# Patient Record
Sex: MALE | Race: ASIAN | Employment: FULL TIME | URBAN - METROPOLITAN AREA
[De-identification: names, ages, dates, MRNs, and addresses within clinical notes are randomized per-mention and may not be internally consistent; named-entity substitution may affect disease eponyms.]

---

## 2017-04-27 ENCOUNTER — OFFICE VISIT (OUTPATIENT)
Dept: PEDIATRICS | Facility: CLINIC | Age: 37
End: 2017-04-27
Payer: COMMERCIAL

## 2017-04-27 ENCOUNTER — RADIANT APPOINTMENT (OUTPATIENT)
Dept: GENERAL RADIOLOGY | Facility: CLINIC | Age: 37
End: 2017-04-27
Attending: NURSE PRACTITIONER
Payer: COMMERCIAL

## 2017-04-27 VITALS
HEART RATE: 91 BPM | WEIGHT: 199.4 LBS | SYSTOLIC BLOOD PRESSURE: 120 MMHG | BODY MASS INDEX: 32.05 KG/M2 | OXYGEN SATURATION: 98 % | TEMPERATURE: 98.9 F | HEIGHT: 66 IN | DIASTOLIC BLOOD PRESSURE: 80 MMHG

## 2017-04-27 DIAGNOSIS — Z00.00 ENCOUNTER FOR ROUTINE ADULT HEALTH EXAMINATION WITHOUT ABNORMAL FINDINGS: Primary | ICD-10-CM

## 2017-04-27 DIAGNOSIS — K62.5 RECTAL BLEEDING: ICD-10-CM

## 2017-04-27 DIAGNOSIS — R05.9 COUGH: ICD-10-CM

## 2017-04-27 DIAGNOSIS — Z13.1 SCREENING FOR DIABETES MELLITUS: ICD-10-CM

## 2017-04-27 DIAGNOSIS — Z13.6 CARDIOVASCULAR SCREENING; LDL GOAL LESS THAN 160: ICD-10-CM

## 2017-04-27 DIAGNOSIS — F17.210 CIGARETTE NICOTINE DEPENDENCE WITHOUT COMPLICATION: ICD-10-CM

## 2017-04-27 PROCEDURE — 99212 OFFICE O/P EST SF 10 MIN: CPT | Mod: 25 | Performed by: NURSE PRACTITIONER

## 2017-04-27 PROCEDURE — 71020 XR CHEST 2 VW: CPT | Performed by: RADIOLOGY

## 2017-04-27 PROCEDURE — 99395 PREV VISIT EST AGE 18-39: CPT | Performed by: NURSE PRACTITIONER

## 2017-04-27 NOTE — MR AVS SNAPSHOT
After Visit Summary   4/27/2017    Main Villalpando    MRN: [id number]           Patient Information     Date Of Birth          1980        Visit Information        Provider Department      4/27/2017 4:40 PM Henrietta Hooker APRN CNP [facility]        Today's Diagnoses     Rectal bleeding    -  1    Encounter for routine adult health examination without abnormal findings        Screening for diabetes mellitus        CARDIOVASCULAR SCREENING; LDL GOAL LESS THAN 160        Cigarette nicotine dependence without complication        Cough          Care Instructions    PLAN:     1.  Orders Placed This Encounter   Medications     varenicline (CHANTIX STARTING MONTH PAK) 0.5 MG X 11 & 1 MG X 42 tablet     Sig: Take 0.5 mg tab daily for 3 days, then 0.5 mg tab twice daily for 4 days, then 1 mg twice daily.     Dispense:  53 tablet     Refill:  0     Orders Placed This Encounter   Procedures     XR Chest 2 Views     Lipid panel reflex to direct LDL     JUST IN CASE     CBC with platelets     Basic metabolic panel     GASTROENTEROLOGY ADULT REF PROCEDURE ONLY       2. Patient needs to follow up in if no improvement,or sooner if worsening of symptoms or other symptoms develop.  FURTHER TESTING:       - chest xray   CONSULTATION/REFERRAL to Gastroenterology  FUTURE LABS:       - Schedule a fasting blood draw   Will follow up and/or notify patient on results via phone or mail to determine further need for followup  Follow up office visit in one year for annual health maintenance exam, sooner PRN.    Preventive Health Recommendations  Male Ages 26 - 39    Yearly exam:             See your health care provider every year in order to  o   Review health changes.   o   Discuss preventive care.    o   Review your medicines if your doctor has prescribed any.    You should be tested each year for STDs (sexually transmitted diseases), if you re at risk.     After age 35, talk to your provider about  cholesterol testing. If you are at risk for heart disease, have your cholesterol tested at least every 5 years.     If you are at risk for diabetes, you should have a diabetes test (fasting glucose).  Shots: Get a flu shot each year. Get a tetanus shot every 10 years.     Nutrition:    Eat at least 5 servings of fruits and vegetables daily.     Eat whole-grain bread, whole-wheat pasta and brown rice instead of white grains and rice.     Talk to your provider about Calcium and Vitamin D.     Lifestyle    Exercise for at least 150 minutes a week (30 minutes a day, 5 days a week). This will help you control your weight and prevent disease.     Limit alcohol to one drink per day.     No smoking.     Wear sunscreen to prevent skin cancer.     See your dentist every six months for an exam and cleaning.   It was a pleasure seeing you today at the [facility]. Thank you for allowing us to care for you today. We truly hope we provided you with the excellent service you deserve. Please let us know if there is anything else we can do for you so we can be sure you are leaving completley satisfied with your care experience.       General information about your clinic   Clinic Hours Lab Hours (Appointments are required)   Mon-Thurs: 7:30 AM - 7 PM Mon-Thurs: 7:30 AM - 7 PM   Fri: 7:30 AM - 5 PM Fri: 7:30 AM - 5 PM        After Hours Nurse Advise & Appts:  [facility]: [phone]  [facility] On Call: to make appointments anytime: [phone] On Call Physician: call [phone] and answering service will page the on call physician.        For urgent appointments, please call [phone] and ask for the triage nurse or your care team clinic nurse.  How to contact my care team:  MyChart: [email or web address]   Phone: [phone]   Fax: [phone]       [facility]:   Phone: [phone]  Hours: 8:00 AM - 6:00 PM  Medication Refills:  Call your pharmacy and they will forward  the refill to us. Please allow 3 business days for your refills to be completed.       Normal or non-critical lab and imaging results will be communicated to you by MyChart, letter or phone within 7 days.  If you do not hear from us within 10 days, please call the clinic. If you have a critical or abnormal lab result, we will notify you by phone as soon as possible.       We now have PWIC (Pediatric Walk in Care)  Monday-Friday from 7:30-4. Simply walk in and be seen for your urgent needs like cough, fever, rash, diarrhea or vomiting, pink eye, UTI. No appointments needed. Ask one of the team for more information      -Your Care Team:    Dr. Evaristo Hopkins - Internal Medicine/Pediatrics   Dr. Corie Munson - Family Medicine  Dr. Viki Meadows - Pediatrics  Henrietta Hooker CNP - Family Practice Nurse Practitioner  Dr. Alberta Alvarado - Pediatrics  Dr. Camilo Richardson - Internal Medicine                    Follow-ups after your visit        Additional Services     GASTROENTEROLOGY ADULT REF PROCEDURE ONLY                 Future tests that were ordered for you today     Open Future Orders        Priority Expected Expires Ordered    XR Chest 2 Views Routine 4/27/2017 4/27/2018 4/27/2017    Lipid panel reflex to direct LDL Routine  12/27/2017 4/27/2017    JUST IN CASE Routine  12/27/2017 4/27/2017    CBC with platelets Routine  12/27/2017 4/27/2017    Basic metabolic panel Routine  12/27/2017 4/27/2017            Who to contact     If you have questions or need follow up information about today's clinic visit or your schedule please contact [facility] directly at [phone].  Normal or non-critical lab and imaging results will be communicated to you by MyChart, letter or phone within 4 business days after the clinic has received the results. If you do not hear from us within 7 days, please contact the clinic through MyChart or phone. If you have a critical or abnormal lab result, we will notify you by phone as  "soon as possible.  Submit refill requests through [employer] or call your pharmacy and they will forward the refill request to us. Please allow 3 business days for your refill to be completed.          Additional Information About Your Visit        UMicIt Information     [employer]It is an electronic gateway that provides easy, online access to your medical records. With [employer]It, you can request a clinic appointment, read your test results, renew a prescription or communicate with your care team.     To sign up for [employer] visit the website at [email or web address]   You will be asked to enter the access code listed below, as well as some personal information. Please follow the directions to create your username and password.     Your access code is: [email or web address]  Expires: 2017  4:50 PM     Your access code will  in 90 days. If you need help or a new code, please contact your [facility] or call [phone] for assistance.        Care EveryWhere ID     This is your Care EveryWhere ID. This could be used by other organizations to access your [facility] medical records  [id number]        Your Vitals Were     Pulse Temperature Height Pulse Oximetry BMI (Body Mass Index)       91 98.9  F (37.2  C) (Temporal) 5' 5.5\" (1.664 m) 98% 32.68 kg/m2        Blood Pressure from Last 3 Encounters:   17 120/80   04/01/15 122/86   14 115/78    Weight from Last 3 Encounters:   17 199 lb 6.4 oz (90.4 kg)   04/01/15 195 lb (88.5 kg)   14 199 lb 9.6 oz (90.5 kg)              We Performed the Following     GASTROENTEROLOGY ADULT REF PROCEDURE ONLY          Today's Medication Changes          These changes are accurate as of: 17  4:51 PM.  If you have any questions, ask your nurse or doctor.               Start taking these medicines.        Dose/Directions    varenicline 0.5 MG X 11 & 1 MG X 42 tablet   Commonly known as:  CHANTIX STARTING MONTH ALLEN   Used for:  " Cigarette nicotine dependence without complication   Started by:  Henreitta Hooker APRN CNP        Take 0.5 mg tab daily for 3 days, then 0.5 mg tab twice daily for 4 days, then 1 mg twice daily.   Quantity:  53 tablet   Refills:  0            Where to get your medicines      These medications were sent to [facility] - [place], [place] - [address], [address]  [address], [address], [facility] [address]     Phone:  [phone]     varenicline 0.5 MG X 11 & 1 MG X 42 tablet                Primary Care Provider    None Specified       No primary provider on file.        Thank you!     Thank you for choosing [facility]  for your care. Our goal is always to provide you with excellent care. Hearing back from our patients is one way we can continue to improve our services. Please take a few minutes to complete the written survey that you may receive in the mail after your visit with us. Thank you!             Your Updated Medication List - Protect others around you: Learn how to safely use, store and throw away your medicines at www.disposemymeds.org.          This list is accurate as of: 4/27/17  4:51 PM.  Always use your most recent med list.                   Brand Name Dispense Instructions for use    albuterol 108 (90 BASE) MCG/ACT Inhaler    PROAIR HFA/PROVENTIL HFA/VENTOLIN HFA    1 Inhaler    Inhale 2 puffs into the lungs every 6 hours as needed for shortness of breath / dyspnea or wheezing       fenofibrate 145 MG tablet     30 tablet    Take 1 tablet (145 mg) by mouth daily       triamcinolone 0.1 % cream    KENALOG    30 g    Apply topically 2 times daily       varenicline 0.5 MG X 11 & 1 MG X 42 tablet    CHANTIX STARTING MONTH ALLEN    53 tablet    Take 0.5 mg tab daily for 3 days, then 0.5 mg tab twice daily for 4 days, then 1 mg twice daily.

## 2017-04-27 NOTE — NURSING NOTE
"Chief Complaint   Patient presents with     Physical     TOMÁS       Initial /80 (BP Location: Right arm, Patient Position: Chair, Cuff Size: Adult Regular)  Pulse 91  Temp 98.9  F (37.2  C) (Temporal)  Ht 5' 5.5\" (1.664 m)  Wt 199 lb 6.4 oz (90.4 kg)  SpO2 98%  BMI 32.68 kg/m2 Estimated body mass index is 32.68 kg/(m^2) as calculated from the following:    Height as of this encounter: 5' 5.5\" (1.664 m).    Weight as of this encounter: 199 lb 6.4 oz (90.4 kg).  Medication Reconciliation: complete      CORI Whatley      "

## 2017-04-27 NOTE — PATIENT INSTRUCTIONS
PLAN:     1.  Orders Placed This Encounter   Medications     varenicline (CHANTIX STARTING MONTH ALLEN) 0.5 MG X 11 & 1 MG X 42 tablet     Sig: Take 0.5 mg tab daily for 3 days, then 0.5 mg tab twice daily for 4 days, then 1 mg twice daily.     Dispense:  53 tablet     Refill:  0     Orders Placed This Encounter   Procedures     XR Chest 2 Views     Lipid panel reflex to direct LDL     JUST IN CASE     CBC with platelets     Basic metabolic panel     GASTROENTEROLOGY ADULT REF PROCEDURE ONLY       2. Patient needs to follow up in if no improvement,or sooner if worsening of symptoms or other symptoms develop.  FURTHER TESTING:       - chest xray   CONSULTATION/REFERRAL to Gastroenterology  FUTURE LABS:       - Schedule a fasting blood draw   Will follow up and/or notify patient on results via phone or mail to determine further need for followup  Follow up office visit in one year for annual health maintenance exam, sooner PRN.    Preventive Health Recommendations  Male Ages 26 - 39    Yearly exam:             See your health care provider every year in order to  o   Review health changes.   o   Discuss preventive care.    o   Review your medicines if your doctor has prescribed any.    You should be tested each year for STDs (sexually transmitted diseases), if you re at risk.     After age 35, talk to your provider about cholesterol testing. If you are at risk for heart disease, have your cholesterol tested at least every 5 years.     If you are at risk for diabetes, you should have a diabetes test (fasting glucose).  Shots: Get a flu shot each year. Get a tetanus shot every 10 years.     Nutrition:    Eat at least 5 servings of fruits and vegetables daily.     Eat whole-grain bread, whole-wheat pasta and brown rice instead of white grains and rice.     Talk to your provider about Calcium and Vitamin D.     Lifestyle    Exercise for at least 150 minutes a week (30 minutes a day, 5 days a week). This will help you  control your weight and prevent disease.     Limit alcohol to one drink per day.     No smoking.     Wear sunscreen to prevent skin cancer.     See your dentist every six months for an exam and cleaning.   It was a pleasure seeing you today at the [facility]. Thank you for allowing us to care for you today. We truly hope we provided you with the excellent service you deserve. Please let us know if there is anything else we can do for you so we can be sure you are leaving completley satisfied with your care experience.       General information about your clinic   Clinic Hours Lab Hours (Appointments are required)   Mon-Thurs: 7:30 AM - 7 PM Mon-Thurs: 7:30 AM - 7 PM   Fri: 7:30 AM - 5 PM Fri: 7:30 AM - 5 PM        After Hours Nurse Advise & Appts:  [facility]: [phone]  [facility] On Call: to make appointments anytime: [phone] On Call Physician: call [phone] and answering service will page the on call physician.        For urgent appointments, please call [phone] and ask for the triage nurse or your care team clinic nurse.  How to contact my care team:  MyChart: [email or web address]   Phone: [phone]   Fax: [phone]       [facility]:   Phone: [phone]  Hours: 8:00 AM - 6:00 PM  Medication Refills:  Call your pharmacy and they will forward the refill to us. Please allow 3 business days for your refills to be completed.       Normal or non-critical lab and imaging results will be communicated to you by MyChart, letter or phone within 7 days.  If you do not hear from us within 10 days, please call the clinic. If you have a critical or abnormal lab result, we will notify you by phone as soon as possible.       We now have PWIC (Pediatric Walk in Care)  Monday-Friday from 7:30-4. Simply walk in and be seen for your urgent needs like cough, fever, rash, diarrhea or vomiting, pink eye, UTI. No appointments needed. Ask one of the team for  more information      -Your Care Team:    Dr. Evaristo Hopkins - Internal Medicine/Pediatrics   Dr. Corie Mnuson - Family Medicine  Dr. Viki Meadows - Pediatrics  Henrietta Hooker CNP - Family Practice Nurse Practitioner  Dr. Alberta Alvarado - Pediatrics  Dr. Camilo Richardson - Internal Medicine

## 2017-04-27 NOTE — PROGRESS NOTES
SUBJECTIVE:     CC: Main Villalpando is an 36 year old male who presents for preventative health visit.     Healthy Habits:    Do you get at least three servings of calcium containing foods daily (dairy, green leafy vegetables, etc.)? yes    Amount of exercise or daily activities, outside of work: 3 day(s) per week    Problems taking medications regularly not applicable    Medication side effects: No    Have you had an eye exam in the past two years? no    Do you see a dentist twice per year? no  Do you have sleep apnea, excessive snoring or daytime drowsiness?yes-snoring    Today's PHQ-2 Score:   PHQ-2 ( 1999 [employer]) 4/27/2017 4/1/2015   Q1: Little interest or pleasure in doing things 0 0   Q2: Feeling down, depressed or hopeless 0 0   PHQ-2 Score 0 0       Abuse: Current or Past(Physical, Sexual or Emotional)- No  Do you feel safe in your environment - Yes    Social History   Substance Use Topics     Smoking status: Current Every Day Smoker     Packs/day: 0.50     Types: Cigarettes     Smokeless tobacco: Never Used     Alcohol use Yes     The patient does not drink >3 drinks per day nor >7 drinks per week.    Last PSA: No results found for: PSA    Recent Labs   Lab Test  05/16/14   1627  04/09/14   0806   CHOL  217*  242*   HDL  38*  28*   LDL  157*  Cannot estimate LDL when triglyceride exceeds 400 mg/dL   TRIG  108  668*   CHOLHDLRATIO  5.6*  8.8*       Reviewed orders with patient. Reviewed health maintenance and updated orders accordingly - Yes    Reviewed and updated as needed this visit by clinical staff  Tobacco  Allergies  Meds  Med Hx  Surg Hx  Fam Hx  Soc Hx        Reviewed and updated as needed this visit by Provider        Past Medical History:   Diagnosis Date     Mild intermittent asthma 4/9/2014      Past Surgical History:   Procedure Laterality Date     LASIK BILATERAL Bilateral 2012       ROS:  CONSTITUTIONAL:NEGATIVE for fever, chills, change in weight  INTEGUMENTARY/SKIN: NEGATIVE for worrisome  rashes, moles or lesions  EYES: NEGATIVE for vision changes or irritation  ENT: NEGATIVE for ear, mouth and throat problems  RESP:NEGATIVE for  SOB and POSITIVE for Hx asthma and has some persistent cough for several weeks   CV: NEGATIVE for chest pain, palpitations or peripheral edema  GI: NEGATIVE for nausea, abdominal pain, heartburn, or change in bowel habits. Had an episode of rectal bleeding about 2 years and then 2 months ago had rectal bleeding. No constipation,   male: negative for dysuria, hematuria, decreased urinary stream, erectile dysfunction, urethral discharge  MUSCULOSKELETAL:NEGATIVE for significant arthralgias or myalgia  NEURO: NEGATIVE for weakness, dizziness or paresthesias  ENDOCRINE: NEGATIVE for temperature intolerance, skin/hair changes  HEME/ALLERGY/IMMUNE: NEGATIVE for allergies, anemia and bleeding disorder  PSYCHIATRIC: NEGATIVE for changes in mood or affect    Patient Active Problem List   Diagnosis     CARDIOVASCULAR SCREENING; LDL GOAL LESS THAN 160     Obesity, Class I, BMI 30-34.9     Mild intermittent asthma     Impaired fasting glucose     Hypertriglyceridemia     Tobacco use disorder     Past Surgical History:   Procedure Laterality Date     LASIK BILATERAL Bilateral 2012       Social History   Substance Use Topics     Smoking status: Current Every Day Smoker     Packs/day: 0.50     Types: Cigarettes     Smokeless tobacco: Never Used     Alcohol use Yes     Family History   Problem Relation Age of Onset     Heart Failure Father      Asthma Maternal Grandmother      DIABETES No family hx of      Coronary Artery Disease No family hx of      Hypertension No family hx of      Hyperlipidemia No family hx of      CEREBROVASCULAR DISEASE No family hx of      Breast Cancer No family hx of      Colon Cancer No family hx of      Prostate Cancer No family hx of      Other Cancer No family hx of      Depression No family hx of      Anxiety Disorder No family hx of      MENTAL ILLNESS No  "family hx of      Substance Abuse No family hx of      Anesthesia Reaction No family hx of      OSTEOPOROSIS No family hx of      Genetic Disorder No family hx of      Thyroid Disease No family hx of      Obesity No family hx of      Unknown/Adopted No family hx of          Current Outpatient Prescriptions   Medication Sig Dispense Refill     albuterol (PROAIR HFA, PROVENTIL HFA, VENTOLIN HFA) 108 (90 BASE) MCG/ACT inhaler Inhale 2 puffs into the lungs every 6 hours as needed for shortness of breath / dyspnea or wheezing 1 Inhaler 3     fenofibrate 145 MG tablet Take 1 tablet (145 mg) by mouth daily (Patient not taking: Reported on 4/27/2017) 30 tablet 5     triamcinolone (KENALOG) 0.1 % cream Apply topically 2 times daily (Patient not taking: Reported on 4/27/2017) 30 g 0     No Known Allergies  OBJECTIVE:     /80 (BP Location: Right arm, Patient Position: Chair, Cuff Size: Adult Regular)  Pulse 91  Temp 98.9  F (37.2  C) (Temporal)  Ht 5' 5.5\" (1.664 m)  Wt 199 lb 6.4 oz (90.4 kg)  SpO2 98%  BMI 32.68 kg/m2  EXAM:  GENERAL: healthy, alert and no distress  EYES: Eyes grossly normal to inspection, PERRL and conjunctivae and sclerae normal  HENT: ear canals and TM's normal, nose and mouth without ulcers or lesions  NECK: no adenopathy, no asymmetry, masses, or scars and thyroid normal to palpation  RESP: lungs clear to auscultation - no rales, rhonchi or wheezes  CV: regular rate and rhythm, normal S1 S2, no S3 or S4, no murmur, click or rub, no peripheral edema and peripheral pulses strong  ABDOMEN: soft, nontender, no hepatosplenomegaly, no masses and bowel sounds normal   (male): normal male genitalia without lesions or urethral discharge, no hernia  RECTAL: normal appearance, no hemorrhoids seen   MS: no gross musculoskeletal defects noted, no edema  SKIN: no suspicious lesions or rashes  NEURO: Normal strength and tone, mentation intact and speech normal  PSYCH: mentation appears normal, affect " normal/bright  LYMPH: no cervical, supraclavicular, axillary, or inguinal adenopathy    Pending orders   XR CHEST 2 VW  4/27/2017 5:45 PM       HISTORY: Cough     COMPARISON: None available.     FINDINGS:   PA and lateral views of the chest were obtained.   Cardiomediastinal silhouette is unremarkable. Pulmonary vasculature is  distinct. No focal airspace opacity. No pleural effusion. No  pneumothorax. The trachea is midline. No acute osseous abnormality.  Visualized upper abdomen unremarkable.         IMPRESSION:   No acute cardiopulmonary abnormality.      I have personally reviewed the examination and initial interpretation  and I agree with the findings.     EVARISTO ALCANTAR MD    ASSESSMENT/PLAN:     Main was seen today for physical.    Diagnoses and all orders for this visit:    Encounter for routine adult health examination without abnormal findings  -     Lipid panel reflex to direct LDL; Future  -     JUST IN CASE; Future  -     CBC with platelets; Future  -     Basic metabolic panel; Future  -     **Vitamin D Deficiency FUTURE anytime; Future    Cough  -     XR Chest 2 Views; Future  Will follow up and/or notify patient on results via phone or mail to determine further need for followup  Further workup and treatment could be done if symptoms persist, worsen or new related symptoms occur. The patient will call in that eventuality.    Rectal bleeding  -     CBC with platelets; Future  -     GASTROENTEROLOGY ADULT REF PROCEDURE ONLY    Screening for diabetes mellitus  -     Basic metabolic panel; Future    CARDIOVASCULAR SCREENING; LDL GOAL LESS THAN 160  -     Lipid panel reflex to direct LDL; Future    Cigarette nicotine dependence without complication  -     varenicline (CHANTIX STARTING MONTH ALLEN) 0.5 MG X 11 & 1 MG X 42 tablet; Take 0.5 mg tab daily for 3 days, then 0.5 mg tab twice daily for 4 days, then 1 mg twice daily.    PLAN:   Patient needs to follow up in if no improvement,or sooner if  "worsening of symptoms or other symptoms develop.  FURTHER TESTING:       - chest xray   CONSULTATION/REFERRAL to Gastroenterology  FUTURE LABS:       - Schedule a fasting blood draw   Will follow up and/or notify patient on results via phone or mail to determine further need for followup  Follow up office visit in one year for annual health maintenance exam, sooner PRN    COUNSELING:  Reviewed preventive health counseling, as reflected in patient instructions  Special attention given to:        Regular exercise       Healthy diet/nutrition       Vision screening         reports that he has been smoking Cigarettes.  He has been smoking about 0.50 packs per day. He has never used smokeless tobacco.  Tobacco Cessation Action Plan: Pharmacotherapies : Chantix  Estimated body mass index is 32.68 kg/(m^2) as calculated from the following:    Height as of this encounter: 5' 5.5\" (1.664 m).    Weight as of this encounter: 199 lb 6.4 oz (90.4 kg).   Weight management plan: Discussed healthy diet and exercise guidelines and patient will follow up in 12 months in clinic to re-evaluate.    Counseling Resources:  ATP IV Guidelines  Pooled Cohorts Equation Calculator  FRAX Risk Assessment  ICSI Preventive Guidelines  Dietary Guidelines for Americans, 2010  USDA's MyPlate  ASA Prophylaxis  Lung CA Screening    Henrietta Hooker, MELISA CNP  [facility]  "

## 2017-04-28 ENCOUNTER — TELEPHONE (OUTPATIENT)
Dept: PEDIATRICS | Facility: CLINIC | Age: 37
End: 2017-04-28

## 2017-04-28 ASSESSMENT — ASTHMA QUESTIONNAIRES: ACT_TOTALSCORE: 22

## 2017-04-28 NOTE — TELEPHONE ENCOUNTER
Attempt #1:  Left message for patient to return call to clinic regarding results. Clinic number given.  Awa Young CMA

## 2017-04-28 NOTE — TELEPHONE ENCOUNTER
Notes Recorded by Henrietta Hooker APRN CNP on 4/28/2017 at 7:47 AM  Please let patient know CXR is normal

## 2017-04-28 NOTE — TELEPHONE ENCOUNTER
[facility] Call Center    Phone Message    Name of Caller: Main    Phone Number: Home number on file [phone] (home) or Cell number on file:    Telephone Information:   Mobile [phone]       Best time to return call: Any    May a detailed message be left on voicemail: yes    Relation to patient: Self    Reason for Call: Other: Patient is returning the clinics call regarding results. Ok to leave msg on personal cell phone. Please advise.      Action Taken: Message routed to:  Primary Care p [phone]

## 2017-05-05 DIAGNOSIS — Z13.1 SCREENING FOR DIABETES MELLITUS: ICD-10-CM

## 2017-05-05 DIAGNOSIS — Z13.6 CARDIOVASCULAR SCREENING; LDL GOAL LESS THAN 160: ICD-10-CM

## 2017-05-05 DIAGNOSIS — E78.1 HYPERTRIGLYCERIDEMIA: Primary | ICD-10-CM

## 2017-05-05 DIAGNOSIS — Z00.00 ENCOUNTER FOR ROUTINE ADULT HEALTH EXAMINATION WITHOUT ABNORMAL FINDINGS: ICD-10-CM

## 2017-05-05 DIAGNOSIS — K62.5 RECTAL BLEEDING: ICD-10-CM

## 2017-05-05 LAB
ALBUMIN SERPL-MCNC: 3.8 G/DL (ref 3.4–5)
ALP SERPL-CCNC: 58 U/L (ref 40–150)
ALT SERPL W P-5'-P-CCNC: 33 U/L (ref 0–70)
ANION GAP SERPL CALCULATED.3IONS-SCNC: 7 MMOL/L (ref 3–14)
AST SERPL W P-5'-P-CCNC: 22 U/L (ref 0–45)
BILIRUB DIRECT SERPL-MCNC: <0.1 MG/DL (ref 0–0.2)
BILIRUB SERPL-MCNC: 0.3 MG/DL (ref 0.2–1.3)
BUN SERPL-MCNC: 18 MG/DL (ref 7–30)
CALCIUM SERPL-MCNC: 8.3 MG/DL (ref 8.5–10.1)
CHLORIDE SERPL-SCNC: 109 MMOL/L (ref 94–109)
CHOLEST SERPL-MCNC: 231 MG/DL
CO2 SERPL-SCNC: 27 MMOL/L (ref 20–32)
CREAT SERPL-MCNC: 1.01 MG/DL (ref 0.66–1.25)
DEPRECATED CALCIDIOL+CALCIFEROL SERPL-MC: 22 UG/L (ref 20–75)
ERYTHROCYTE [DISTWIDTH] IN BLOOD BY AUTOMATED COUNT: 14.2 % (ref 10–15)
GFR SERPL CREATININE-BSD FRML MDRD: 84 ML/MIN/1.7M2
GLUCOSE SERPL-MCNC: 99 MG/DL (ref 70–99)
HCT VFR BLD AUTO: 49 % (ref 40–53)
HDLC SERPL-MCNC: 45 MG/DL
HGB BLD-MCNC: 16.7 G/DL (ref 13.3–17.7)
LDLC SERPL CALC-MCNC: 158 MG/DL
MCH RBC QN AUTO: 29.3 PG (ref 26.5–33)
MCHC RBC AUTO-ENTMCNC: 34.1 G/DL (ref 31.5–36.5)
MCV RBC AUTO: 86 FL (ref 78–100)
NONHDLC SERPL-MCNC: 186 MG/DL
PLATELET # BLD AUTO: 212 10E9/L (ref 150–450)
POTASSIUM SERPL-SCNC: 4.2 MMOL/L (ref 3.4–5.3)
PROT SERPL-MCNC: 7.4 G/DL (ref 6.8–8.8)
RBC # BLD AUTO: 5.7 10E12/L (ref 4.4–5.9)
SODIUM SERPL-SCNC: 143 MMOL/L (ref 133–144)
TRIGL SERPL-MCNC: 140 MG/DL
WBC # BLD AUTO: 6.6 10E9/L (ref 4–11)

## 2017-05-05 PROCEDURE — 82306 VITAMIN D 25 HYDROXY: CPT | Performed by: NURSE PRACTITIONER

## 2017-05-05 PROCEDURE — 80061 LIPID PANEL: CPT | Performed by: NURSE PRACTITIONER

## 2017-05-05 PROCEDURE — 80048 BASIC METABOLIC PNL TOTAL CA: CPT | Performed by: NURSE PRACTITIONER

## 2017-05-05 PROCEDURE — 80076 HEPATIC FUNCTION PANEL: CPT | Performed by: NURSE PRACTITIONER

## 2017-05-05 PROCEDURE — 85027 COMPLETE CBC AUTOMATED: CPT | Performed by: NURSE PRACTITIONER

## 2017-05-05 PROCEDURE — 36415 COLL VENOUS BLD VENIPUNCTURE: CPT | Performed by: NURSE PRACTITIONER

## 2017-05-05 NOTE — LETTER
May 5, 2017      Main Villalpando                                                                [address]  [place] [address]          Dear Main,    The results of your recent   -Kidney function is normal (Cr, GFR), Sodium is normal, Potassium is normal, Calcium is normal, Glucose is normal (diabetes screening test).   -Cholesterol levels are at your goal levels.  ADVISE: Continuing your medication, a regular exercise program with at least 30 minutes of aerobic exercise 3-4 days/week ( 45 minutes 4-6 days/week if weight loss needed), and a low saturated fat,/low carbohydrate diet are helpful to maintain this.  Rechecking your fasting cholesterol panel in 12 months is recommended (Lipid w/ LDL reflex).  -TSH (thyroid stimulating hormone) level is normal which indicates normal thyroid function.  -Normal red blood cell (hgb) levels, normal white blood cell count and normal platelet levels.  -Vitamin D level is below normal, 1000 IU daily in diet or supplements is recommended.     Results for orders placed or performed in visit on 05/05/17   Lipid panel reflex to direct LDL   Result Value Ref Range    Cholesterol 231 (H) <200 mg/dL    Triglycerides 140 <150 mg/dL    HDL Cholesterol 45 >39 mg/dL    LDL Cholesterol Calculated 158 (H) <100 mg/dL    Non HDL Cholesterol 186 (H) <130 mg/dL   CBC with platelets   Result Value Ref Range    WBC 6.6 4.0 - 11.0 10e9/L    RBC Count 5.70 4.4 - 5.9 10e12/L    Hemoglobin 16.7 13.3 - 17.7 g/dL    Hematocrit 49.0 40.0 - 53.0 %    MCV 86 78 - 100 fl    MCH 29.3 26.5 - 33.0 pg    MCHC 34.1 31.5 - 36.5 g/dL    RDW 14.2 10.0 - 15.0 %    Platelet Count 212 150 - 450 10e9/L   Basic metabolic panel   Result Value Ref Range    Sodium 143 133 - 144 mmol/L    Potassium 4.2 3.4 - 5.3 mmol/L    Chloride 109 94 - 109 mmol/L    Carbon Dioxide 27 20 - 32 mmol/L    Anion Gap 7 3 - 14 mmol/L    Glucose 99 70 - 99 mg/dL    Urea Nitrogen 18 7 - 30 mg/dL    Creatinine 1.01 0.66 - 1.25 mg/dL    GFR  Estimate 84 >60 mL/min/1.7m2    GFR Estimate If Black >90   GFR Calc   >60 mL/min/1.7m2    Calcium 8.3 (L) 8.5 - 10.1 mg/dL   **Vitamin D Deficiency FUTURE anytime   Result Value Ref Range    Vitamin D Deficiency screening 22 20 - 75 ug/L         Please make a follow-up appointment if you have additional questions.    Sincerely,       Henrietta Hooker NP, APRN CNP

## 2017-10-06 ENCOUNTER — SURGERY (OUTPATIENT)
Age: 37
End: 2017-10-06

## 2017-10-06 ENCOUNTER — HOSPITAL ENCOUNTER (OUTPATIENT)
Facility: AMBULATORY SURGERY CENTER | Age: 37
Discharge: HOME | End: 2017-10-06
Attending: INTERNAL MEDICINE | Admitting: INTERNAL MEDICINE
Payer: COMMERCIAL

## 2017-10-06 VITALS
DIASTOLIC BLOOD PRESSURE: 79 MMHG | SYSTOLIC BLOOD PRESSURE: 107 MMHG | TEMPERATURE: 97.5 F | OXYGEN SATURATION: 98 % | RESPIRATION RATE: 16 BRPM

## 2017-10-06 LAB — COLONOSCOPY: NORMAL

## 2017-10-06 PROCEDURE — G8918 PT W/O PREOP ORDER IV AB PRO: HCPCS

## 2017-10-06 PROCEDURE — 45385 COLONOSCOPY W/LESION REMOVAL: CPT

## 2017-10-06 PROCEDURE — G8907 PT DOC NO EVENTS ON DISCHARG: HCPCS

## 2017-10-06 PROCEDURE — 88305 TISSUE EXAM BY PATHOLOGIST: CPT | Performed by: INTERNAL MEDICINE

## 2017-10-06 RX ORDER — LIDOCAINE 40 MG/G
CREAM TOPICAL
Status: DISCONTINUED | OUTPATIENT
Start: 2017-10-06 | End: 2017-10-07 | Stop reason: HOSPADM

## 2017-10-06 RX ORDER — FENTANYL CITRATE 50 UG/ML
INJECTION, SOLUTION INTRAMUSCULAR; INTRAVENOUS PRN
Status: DISCONTINUED | OUTPATIENT
Start: 2017-10-06 | End: 2017-10-06 | Stop reason: HOSPADM

## 2017-10-06 RX ORDER — ONDANSETRON 2 MG/ML
4 INJECTION INTRAMUSCULAR; INTRAVENOUS
Status: DISCONTINUED | OUTPATIENT
Start: 2017-10-06 | End: 2017-10-07 | Stop reason: HOSPADM

## 2017-10-06 RX ADMIN — FENTANYL CITRATE 100 MCG: 50 INJECTION, SOLUTION INTRAMUSCULAR; INTRAVENOUS at 09:17

## 2017-10-09 LAB — COPATH REPORT: NORMAL

## 2018-04-30 ENCOUNTER — OFFICE VISIT (OUTPATIENT)
Dept: PEDIATRICS | Facility: CLINIC | Age: 38
End: 2018-04-30
Payer: COMMERCIAL

## 2018-04-30 VITALS
TEMPERATURE: 98.3 F | WEIGHT: 211.6 LBS | OXYGEN SATURATION: 98 % | BODY MASS INDEX: 34.01 KG/M2 | DIASTOLIC BLOOD PRESSURE: 78 MMHG | HEIGHT: 66 IN | SYSTOLIC BLOOD PRESSURE: 112 MMHG | HEART RATE: 98 BPM

## 2018-04-30 DIAGNOSIS — R40.0 DAYTIME SLEEPINESS: ICD-10-CM

## 2018-04-30 DIAGNOSIS — Z13.1 SCREENING FOR DIABETES MELLITUS: ICD-10-CM

## 2018-04-30 DIAGNOSIS — M25.519 NECK AND SHOULDER PAIN: ICD-10-CM

## 2018-04-30 DIAGNOSIS — R06.83 SNORES: ICD-10-CM

## 2018-04-30 DIAGNOSIS — Z13.6 CARDIOVASCULAR SCREENING; LDL GOAL LESS THAN 160: ICD-10-CM

## 2018-04-30 DIAGNOSIS — F17.200 TOBACCO USE DISORDER: ICD-10-CM

## 2018-04-30 DIAGNOSIS — M54.50 BILATERAL LOW BACK PAIN WITHOUT SCIATICA, UNSPECIFIED CHRONICITY: ICD-10-CM

## 2018-04-30 DIAGNOSIS — Z13.29 SCREENING FOR THYROID DISORDER: ICD-10-CM

## 2018-04-30 DIAGNOSIS — M54.2 NECK AND SHOULDER PAIN: ICD-10-CM

## 2018-04-30 DIAGNOSIS — Z00.00 ENCOUNTER FOR ROUTINE ADULT HEALTH EXAMINATION WITHOUT ABNORMAL FINDINGS: Primary | ICD-10-CM

## 2018-04-30 PROCEDURE — 99395 PREV VISIT EST AGE 18-39: CPT | Performed by: NURSE PRACTITIONER

## 2018-04-30 RX ORDER — VARENICLINE TARTRATE 1 MG/1
1 TABLET, FILM COATED ORAL 2 TIMES DAILY
Qty: 56 TABLET | Refills: 2 | Status: SHIPPED | OUTPATIENT
Start: 2018-04-30 | End: 2020-02-20

## 2018-04-30 NOTE — PROGRESS NOTES
SUBJECTIVE:   CC: Main Villalpando is an 37 year old male who presents for preventative health visit.     Healthy Habits:    Do you get at least three servings of calcium containing foods daily (dairy, green leafy vegetables, etc.)? No not really    Amount of exercise or daily activities, outside of work: none    Problems taking medications regularly No    Medication side effects: No    Have you had an eye exam in the past two years? yes    Do you see a dentist twice per year? yes    Do you have sleep apnea, excessive snoring or daytime drowsiness?yes       Today's PHQ-2 Score:   PHQ-2 ( 1999 Pf[employer]) 4/30/2018 4/27/2017   Q1: Little interest or pleasure in doing things 0 0   Q2: Feeling down, depressed or hopeless 1 0   PHQ-2 Score 1 0       Abuse: Current or Past(Physical, Sexual or Emotional)- No  Do you feel safe in your environment - Yes    Social History   Substance Use Topics     Smoking status: Current Every Day Smoker     Packs/day: 0.50     Types: Cigarettes     Smokeless tobacco: Never Used     Alcohol use Yes      If you drink alcohol do you typically have >3 drinks per day or >7 drinks per week? No                      Last PSA: No results found for: PSA    Reviewed orders with patient. Reviewed health maintenance and updated orders accordingly - Yes  Labs reviewed in EPIC  Patient Active Problem List   Diagnosis     CARDIOVASCULAR SCREENING; LDL GOAL LESS THAN 160     Obesity, Class I, BMI 30-34.9     Mild intermittent asthma     Impaired fasting glucose     Hypertriglyceridemia     Tobacco use disorder     Past Surgical History:   Procedure Laterality Date     COLONOSCOPY N/A 10/6/2017    Procedure: COMBINED COLONOSCOPY, SINGLE OR MULTIPLE BIOPSY/POLYPECTOMY BY BIOPSY;;  Surgeon: Duane, William Charles, MD;  Location: [facility] OR     COLONOSCOPY WITH CO2 INSUFFLATION N/A 10/6/2017    Procedure: COLONOSCOPY WITH CO2 INSUFFLATION;  Colonoscopy, Henrietta Hooker, Rectal bleeding, BMI 32.68,  [facility] fax  [phone](prep already picked up, rescheduled);  Surgeon: Duane, William Charles, MD;  Location: [facility] OR     LASIK BILATERAL Bilateral 2012       Social History   Substance Use Topics     Smoking status: Current Every Day Smoker     Packs/day: 0.50     Types: Cigarettes     Smokeless tobacco: Never Used     Alcohol use Yes     Family History   Problem Relation Age of Onset     Heart Failure Father      Hypertension Father      Hyperlipidemia Father      Asthma Maternal Grandmother      Hyperlipidemia Maternal Grandmother      DIABETES No family hx of      Coronary Artery Disease No family hx of      CEREBROVASCULAR DISEASE No family hx of      Breast Cancer No family hx of      Colon Cancer No family hx of      Prostate Cancer No family hx of      Other Cancer No family hx of      Depression No family hx of      Anxiety Disorder No family hx of      MENTAL ILLNESS No family hx of      Substance Abuse No family hx of      Anesthesia Reaction No family hx of      OSTEOPOROSIS No family hx of      Genetic Disorder No family hx of      Thyroid Disease No family hx of      Obesity No family hx of      Unknown/Adopted No family hx of          Current Outpatient Prescriptions   Medication Sig Dispense Refill     albuterol (PROAIR HFA, PROVENTIL HFA, VENTOLIN HFA) 108 (90 BASE) MCG/ACT inhaler Inhale 2 puffs into the lungs every 6 hours as needed for shortness of breath / dyspnea or wheezing 1 Inhaler 3     fenofibrate 145 MG tablet Take 1 tablet (145 mg) by mouth daily 30 tablet 5     triamcinolone (KENALOG) 0.1 % cream Apply topically 2 times daily (Patient not taking: Reported on 4/27/2017) 30 g 0     varenicline (CHANTIX STARTING MONTH PAK) 0.5 MG X 11 & 1 MG X 42 tablet Take 0.5 mg tab daily for 3 days, then 0.5 mg tab twice daily for 4 days, then 1 mg twice daily. (Patient not taking: Reported on 4/30/2018) 53 tablet 0     No Known Allergies    Reviewed and updated as needed this visit by clinical staff      "    Reviewed and updated as needed this visit by Provider        Past Medical History:   Diagnosis Date     Mild intermittent asthma 4/9/2014      Past Surgical History:   Procedure Laterality Date     COLONOSCOPY N/A 10/6/2017    Procedure: COMBINED COLONOSCOPY, SINGLE OR MULTIPLE BIOPSY/POLYPECTOMY BY BIOPSY;;  Surgeon: Duane, William Charles, MD;  Location: [facility]     COLONOSCOPY WITH CO2 INSUFFLATION N/A 10/6/2017    Procedure: COLONOSCOPY WITH CO2 INSUFFLATION;  Colonoscopy, Henrietta Hooker, Rectal bleeding, BMI 32.68, [facility] fax [phone](prep already picked up, rescheduled);  Surgeon: Duane, William Charles, MD;  Location: [facility] OR     LASIK BILATERAL Bilateral 2012     ROS:  CONSTITUTIONAL:POSITIVE  for weight gain and NEGATIVE  for anorexia  INTEGUMENTARY/SKIN: NEGATIVE for worrisome rashes, moles or lesions  EYES: NEGATIVE for vision changes or irritation  ENT: NEGATIVE for ear, mouth and throat problems. Wife says he snores and thinks stops breath at night  RESP:NEGATIVE for significant cough or SOB  CV: NEGATIVE for chest pain, palpitations or peripheral edema  GI: NEGATIVE for nausea, abdominal pain, heartburn, or change in bowel habits   male: negative for dysuria, hematuria, decreased urinary stream, erectile dysfunction, urethral discharge  MUSCULOSKELETAL:NEGATIVE for significant arthralgias or myalgia. Sometime will have some low back pain and will be forever it seems   NEURO: NEGATIVE for weakness, dizziness or paresthesias  ENDOCRINE: NEGATIVE for temperature intolerance, skin/hair changes  HEME/ALLERGY/IMMUNE: NEGATIVE for bleeding problems  PSYCHIATRIC: NEGATIVE for changes in mood or affect    OBJECTIVE:   /78 (BP Location: Right arm, Patient Position: Chair, Cuff Size: Adult Large)  Pulse 98  Temp 98.3  F (36.8  C) (Temporal)  Ht 5' 5.55\" (1.665 m)  Wt 211 lb 9.6 oz (96 kg)  SpO2 98%  BMI 34.62 kg/m2   Wt Readings from Last 4 Encounters:   04/30/18 211 lb 9.6 oz (96 " kg)   04/27/17 199 lb 6.4 oz (90.4 kg)   04/01/15 195 lb (88.5 kg)   05/16/14 199 lb 9.6 oz (90.5 kg)       EXAM:  GENERAL: healthy, alert, no distress and obese  EYES: Eyes grossly normal to inspection, PERRL and conjunctivae and sclerae normal  HENT: ear canals and TM's normal, nose and mouth without ulcers or lesions  NECK: no adenopathy, no asymmetry, masses, or scars and thyroid normal to palpation  RESP: lungs clear to auscultation - no rales, rhonchi or wheezes  CV: regular rate and rhythm, normal S1 S2, no S3 or S4, no murmur, click or rub, no peripheral edema and peripheral pulses strong  ABDOMEN: soft, nontender, no hepatosplenomegaly, no masses and bowel sounds normal   (male): normal male genitalia without lesions or urethral discharge, no hernia  RECTAL: deferred  MS: no gross musculoskeletal defects noted, no edema  SKIN: no suspicious lesions or rashes  NEURO: Normal strength and tone, mentation intact and speech normal  PSYCH: mentation appears normal, affect normal/bright  LYMPH: no cervical, supraclavicular, axillary, or inguinal adenopathy    ASSESSMENT/PLAN:   Main was seen today for physical.    Diagnoses and all orders for this visit:    Encounter for routine adult health examination without abnormal findings  -     Lipid panel reflex to direct LDL Fasting; Future  -     **Comprehensive metabolic panel FUTURE anytime; Future  -     **TSH with free T4 reflex FUTURE anytime; Future  -     JUST IN CASE; Future    CARDIOVASCULAR SCREENING; LDL GOAL LESS THAN 160  -     Lipid panel reflex to direct LDL Fasting; Future    Screening for diabetes mellitus  -     **Comprehensive metabolic panel FUTURE anytime; Future    Screening for thyroid disorder  -     **TSH with free T4 reflex FUTURE anytime; Future    Tobacco use disorder  -     varenicline (CHANTIX STARTING MONTH PAK) 0.5 MG X 11 & 1 MG X 42 tablet; Take 0.5 mg tab daily for 3 days, then 0.5 mg tab twice daily for 4 days, then 1 mg twice  daily.  -     varenicline (CHANTIX) 1 MG tablet; Take 1 tablet (1 mg) by mouth 2 times daily    Snores  -     SLEEP EVALUATION & MANAGEMENT REFERRAL - [facility]  [phone] (Age 15 and up); Future    Daytime sleepiness    Bilateral low back pain without sciatica, unspecified chronicity  -     PHYSICAL THERAPY REFERRAL      1.   Symptomatic therapy suggested: restart chantix.  A high fiber diet with plenty of fluids (up to 8 glasses of water daily) is suggested to relieve these symptoms.  Metamucil, 1 tablespoon once or twice daily can be used to keep bowels regular if needed.    2.  Orders Placed This Encounter   Medications     varenicline (CHANTIX STARTING MONTH PAK) 0.5 MG X 11 & 1 MG X 42 tablet     Sig: Take 0.5 mg tab daily for 3 days, then 0.5 mg tab twice daily for 4 days, then 1 mg twice daily.     Dispense:  53 tablet     Refill:  0     varenicline (CHANTIX) 1 MG tablet     Sig: Take 1 tablet (1 mg) by mouth 2 times daily     Dispense:  56 tablet     Refill:  2     Orders Placed This Encounter   Procedures     Lipid panel reflex to direct LDL Fasting     **Comprehensive metabolic panel FUTURE anytime     **TSH with free T4 reflex FUTURE anytime     JUST IN CASE     SLEEP EVALUATION & MANAGEMENT REFERRAL - [facility]  [phone] (Age 15 and up)     PHYSICAL THERAPY REFERRAL       3. Patient needs to follow up in if no improvement,or sooner if worsening of symptoms or other symptoms develop.  CONSULTATION/REFERRAL to physical therapy and sleep study   FUTURE LABS:       - Schedule a fasting blood draw   Will follow up and/or notify patient on results via phone or mail to determine further need for followup  COUNSELING:  Reviewed preventive health counseling, as reflected in patient instructions  Special attention given to:        Regular exercise       Healthy diet/nutrition       The ASCVD Risk score (Evelyne MEHRAN Jr, et al., 2013) failed  "to calculate for the following reasons:    The 2013 ASCVD risk score is only valid for ages 40 to 79       reports that he has been smoking Cigarettes.  He has been smoking about 0.50 packs per day. He has never used smokeless tobacco.  Tobacco Cessation Action Plan: Pharmacotherapies : Chantix  Estimated body mass index is 32.68 kg/(m^2) as calculated from the following:    Height as of 4/27/17: 5' 5.5\" (1.664 m).    Weight as of 4/27/17: 199 lb 6.4 oz (90.4 kg).   Weight management plan: Discussed healthy diet and exercise guidelines and patient will follow up in 12 months in clinic to re-evaluate.    Counseling Resources:  ATP IV Guidelines  Pooled Cohorts Equation Calculator  FRAX Risk Assessment  ICSI Preventive Guidelines  Dietary Guidelines for Americans, 2010  USDA's MyPlate  ASA Prophylaxis  Lung CA Screening    MELISA Navas CNP  [facility]  "

## 2018-04-30 NOTE — NURSING NOTE
"Chief Complaint   Patient presents with     Physical       Initial /78 (BP Location: Right arm, Patient Position: Chair, Cuff Size: Adult Large)  Pulse 98  Temp 98.3  F (36.8  C) (Temporal)  Ht 5' 5.55\" (1.665 m)  Wt 211 lb 9.6 oz (96 kg)  SpO2 98%  BMI 34.62 kg/m2 Estimated body mass index is 34.62 kg/(m^2) as calculated from the following:    Height as of this encounter: 5' 5.55\" (1.665 m).    Weight as of this encounter: 211 lb 9.6 oz (96 kg).  Medication Reconciliation: complete     Liya Morales CMA      "

## 2018-04-30 NOTE — PATIENT INSTRUCTIONS
PLAN:   1.   Symptomatic therapy suggested: restart chantix.  A high fiber diet with plenty of fluids (up to 8 glasses of water daily) is suggested to relieve these symptoms.  Metamucil, 1 tablespoon once or twice daily can be used to keep bowels regular if needed.    2.  Orders Placed This Encounter   Medications     varenicline (CHANTIX STARTING MONTH ALLEN) 0.5 MG X 11 & 1 MG X 42 tablet     Sig: Take 0.5 mg tab daily for 3 days, then 0.5 mg tab twice daily for 4 days, then 1 mg twice daily.     Dispense:  53 tablet     Refill:  0     varenicline (CHANTIX) 1 MG tablet     Sig: Take 1 tablet (1 mg) by mouth 2 times daily     Dispense:  56 tablet     Refill:  2     Orders Placed This Encounter   Procedures     Lipid panel reflex to direct LDL Fasting     **Comprehensive metabolic panel FUTURE anytime     **TSH with free T4 reflex FUTURE anytime     JUST IN CASE     SLEEP EVALUATION & MANAGEMENT REFERRAL - [facility]  [phone] (Age 15 and up)     PHYSICAL THERAPY REFERRAL       3. Patient needs to follow up in if no improvement,or sooner if worsening of symptoms or other symptoms develop.  CONSULTATION/REFERRAL to physical therapy and sleep study   FUTURE LABS:       - Schedule a fasting blood draw   Will follow up and/or notify patient on results via phone or mail to determine further need for followup  Preventive Health Recommendations  Male Ages 26 - 39    Yearly exam:             See your health care provider every year in order to  o   Review health changes.   o   Discuss preventive care.    o   Review your medicines if your doctor has prescribed any.    You should be tested each year for STDs (sexually transmitted diseases), if you re at risk.     After age 35, talk to your provider about cholesterol testing. If you are at risk for heart disease, have your cholesterol tested at least every 5 years.     If you are at risk for diabetes, you should have a diabetes test  (fasting glucose).  Shots: Get a flu shot each year. Get a tetanus shot every 10 years.     Nutrition:    Eat at least 5 servings of fruits and vegetables daily.     Eat whole-grain bread, whole-wheat pasta and brown rice instead of white grains and rice.     Talk to your provider about Calcium and Vitamin D.     Lifestyle    Exercise for at least 150 minutes a week (30 minutes a day, 5 days a week). This will help you control your weight and prevent disease.     Limit alcohol to one drink per day.     No smoking.     Wear sunscreen to prevent skin cancer.     See your dentist every six months for an exam and cleaning.   It was a pleasure seeing you today at the [facility]. Thank you for allowing us to care for you today. We truly hope we provided you with the excellent service you deserve. Please let us know if there is anything else we can do for you so we can be sure you are leaving completley satisfied with your care experience.       General information about your clinic   Clinic Hours Lab Hours (Appointments are required)   Mon-Thurs: 7:30 AM - 7 PM Mon-Thurs: 7:30 AM - 7 PM   Fri: 7:30 AM - 5 PM Fri: 7:30 AM - 5 PM        After Hours Nurse Advise & Appts:  [facility]: [phone]  [facility] On Call: to make appointments anytime: [phone] On Call Physician: call [phone] and answering service will page the on call physician.        For urgent appointments, please call [phone] and ask for the triage nurse or your care team clinic nurse.  How to contact my care team:  [facility]: [email or web address]   Phone: [phone]   Fax: [phone]       [facility]:   Phone: [phone]  Hours: 8:00 AM - 6:00 PM  Medication Refills:  Call your pharmacy and they will forward the refill to us. Please allow 3 business days for your refills to be completed.       Normal or non-critical lab and imaging results will be communicated to you by [facility], letter  or phone within 7 days.  If you do not hear from us within 10 days, please call the clinic. If you have a critical or abnormal lab result, we will notify you by phone as soon as possible.       We now have PWIC (Pediatric Walk in Care)  Monday-Friday from 7:30-4. Simply walk in and be seen for your urgent needs like cough, fever, rash, diarrhea or vomiting, pink eye, UTI. No appointments needed. Ask one of the team for more information      -Your Care Team:    Dr. Evaristo Hopkins - Internal Medicine/Pediatrics   Dr. Corie Munson - Family Medicine  Dr. Viki Meadows - Pediatrics  Dr. Alberta Alvarado - Pediatrics  Henrietta Hooker CNP - Family Practice Nurse Practitioner

## 2018-04-30 NOTE — MR AVS SNAPSHOT
After Visit Summary   4/30/2018    Main Villalpando    MRN: [id number]           Patient Information     Date Of Birth          1980        Visit Information        Provider Department      4/30/2018 4:30 PM Henrietta Hooker APRN CNP [facility]        Today's Diagnoses     Encounter for routine adult health examination without abnormal findings    -  1    CARDIOVASCULAR SCREENING; LDL GOAL LESS THAN 160        Screening for diabetes mellitus        Screening for thyroid disorder        Tobacco use disorder        Snores        Daytime sleepiness        Bilateral low back pain without sciatica, unspecified chronicity          Care Instructions    PLAN:   1.   Symptomatic therapy suggested: restart chantix.  A high fiber diet with plenty of fluids (up to 8 glasses of water daily) is suggested to relieve these symptoms.  Metamucil, 1 tablespoon once or twice daily can be used to keep bowels regular if needed.    2.  Orders Placed This Encounter   Medications     varenicline (CHANTIX STARTING MONTH PAK) 0.5 MG X 11 & 1 MG X 42 tablet     Sig: Take 0.5 mg tab daily for 3 days, then 0.5 mg tab twice daily for 4 days, then 1 mg twice daily.     Dispense:  53 tablet     Refill:  0     varenicline (CHANTIX) 1 MG tablet     Sig: Take 1 tablet (1 mg) by mouth 2 times daily     Dispense:  56 tablet     Refill:  2     Orders Placed This Encounter   Procedures     Lipid panel reflex to direct LDL Fasting     **Comprehensive metabolic panel FUTURE anytime     **TSH with free T4 reflex FUTURE anytime     JUST IN CASE     SLEEP EVALUATION & MANAGEMENT REFERRAL - ADULT -[facility] [place]  [phone] (Age 15 and up)     PHYSICAL THERAPY REFERRAL       3. Patient needs to follow up in if no improvement,or sooner if worsening of symptoms or other symptoms develop.  CONSULTATION/REFERRAL to physical therapy and sleep study   FUTURE LABS:       - Schedule a fasting blood draw    Will follow up and/or notify patient on results via phone or mail to determine further need for followup  Preventive Health Recommendations  Male Ages 26 - 39    Yearly exam:             See your health care provider every year in order to  o   Review health changes.   o   Discuss preventive care.    o   Review your medicines if your doctor has prescribed any.    You should be tested each year for STDs (sexually transmitted diseases), if you re at risk.     After age 35, talk to your provider about cholesterol testing. If you are at risk for heart disease, have your cholesterol tested at least every 5 years.     If you are at risk for diabetes, you should have a diabetes test (fasting glucose).  Shots: Get a flu shot each year. Get a tetanus shot every 10 years.     Nutrition:    Eat at least 5 servings of fruits and vegetables daily.     Eat whole-grain bread, whole-wheat pasta and brown rice instead of white grains and rice.     Talk to your provider about Calcium and Vitamin D.     Lifestyle    Exercise for at least 150 minutes a week (30 minutes a day, 5 days a week). This will help you control your weight and prevent disease.     Limit alcohol to one drink per day.     No smoking.     Wear sunscreen to prevent skin cancer.     See your dentist every six months for an exam and cleaning.   It was a pleasure seeing you today at the [facility]. Thank you for allowing us to care for you today. We truly hope we provided you with the excellent service you deserve. Please let us know if there is anything else we can do for you so we can be sure you are leaving completley satisfied with your care experience.       General information about your clinic   Clinic Hours Lab Hours (Appointments are required)   Mon-Thurs: 7:30 AM - 7 PM Mon-Thurs: 7:30 AM - 7 PM   Fri: 7:30 AM - 5 PM Fri: 7:30 AM - 5 PM        After Hours Nurse Advise & Appts:  [facility]: [phone]  [facility]  On Call: to make appointments anytime: [phone] On Call Physician: call [phone] and answering service will page the on call physician.        For urgent appointments, please call [phone] and ask for the triage nurse or your care team clinic nurse.  How to contact my care team:  [facility]: [email or web address]   Phone: [phone]   Fax: [phone]       [facility]:   Phone: [phone]  Hours: 8:00 AM - 6:00 PM  Medication Refills:  Call your pharmacy and they will forward the refill to us. Please allow 3 business days for your refills to be completed.       Normal or non-critical lab and imaging results will be communicated to you by MyChart, letter or phone within 7 days.  If you do not hear from us within 10 days, please call the clinic. If you have a critical or abnormal lab result, we will notify you by phone as soon as possible.       We now have PWIC (Pediatric Walk in Care)  Monday-Friday from 7:30-4. Simply walk in and be seen for your urgent needs like cough, fever, rash, diarrhea or vomiting, pink eye, UTI. No appointments needed. Ask one of the team for more information      -Your Care Team:    Dr. Evaristo Hopkins - Internal Medicine/Pediatrics   Dr. Corie Munson - Family Medicine  Dr. Viki Meadows - Pediatrics  Dr. Alberta Alvarado - Pediatrics  Henrietta Hooker CNP - Family Practice Nurse Practitioner                         Follow-ups after your visit        Additional Services     PHYSICAL THERAPY REFERRAL       *This therapy referral will be filtered to a centralized scheduling office at [facility] and the patient will receive a call to schedule an appointment at a [facility] location most convenient for them. *     [facility] provides Physical Therapy evaluation and treatment and many specialty services across the [facility] system.  If requesting a specialty program, please choose from the list below.    If you have not heard from the  "scheduling office within 2 business days, please call [phone] for all locations, with the exception of [place], please call [phone] and [place], please call [phone]  Treatment: Evaluation & Treatment  Special Instructions/Modalities:   Special Programs: None    Please be aware that coverage of these services is subject to the terms and limitations of your health insurance plan.  Call member services at your health plan with any benefit or coverage questions.      **Note to Provider:  If you are referring outside of [place] [facility] for the therapy appointment, please list the name of the location in the \"special instructions\" above, print the referral and give to the patient to schedule the appointment.            SLEEP EVALUATION & MANAGEMENT REFERRAL - [facility]  [phone] (Age 15 and up)       Please be aware that coverage of these services is subject to the terms and limitations of your health insurance plan.  Call member services at your health plan with any benefit or coverage questions.      Please bring the following to your appointment:    >>   List of current medications   >>   This referral request   >>   Any documents/labs given to you for this referral                      Future tests that were ordered for you today     Open Future Orders        Priority Expected Expires Ordered    SLEEP EVALUATION & MANAGEMENT REFERRAL - [facility]  [phone] (Age 15 and up) Routine  4/30/2019 4/30/2018    Lipid panel reflex to direct LDL Fasting Routine 4/30/2018 12/30/2018 4/30/2018    **Comprehensive metabolic panel FUTURE anytime Routine 4/30/2018 12/30/2018 4/30/2018    **TSH with free T4 reflex FUTURE anytime Routine 4/30/2018 12/30/2018 4/30/2018    JUST IN CASE Routine  12/30/2018 4/30/2018            Who to contact     If you have questions or need follow up information about today's clinic visit or your schedule please " "contact [facility] directly at [phone].  Normal or non-critical lab and imaging results will be communicated to you by MyChart, letter or phone within 4 business days after the clinic has received the results. If you do not hear from us within 7 days, please contact the clinic through MyChart or phone. If you have a critical or abnormal lab result, we will notify you by phone as soon as possible.  Submit refill requests through [employer] [place] or call your pharmacy and they will forward the refill request to us. Please allow 3 business days for your refill to be completed.          Additional Information About Your Visit        [employer] [place] Information     [employer] [place] is an electronic gateway that provides easy, online access to your medical records. With [employer] [place], you can request a clinic appointment, read your test results, renew a prescription or communicate with your care team.     To sign up for [employer] [place] visit the website at [email or web address]   You will be asked to enter the access code listed below, as well as some personal information. Please follow the directions to create your username and password.     Your access code is: [email or web address]  Expires: 2018  5:05 PM     Your access code will  in 90 days. If you need help or a new code, please contact your [facility] or call [phone] for assistance.        Care EveryWhere ID     This is your Care EveryWhere ID. This could be used by other organizations to access your [place] medical records  [id number]        Your Vitals Were     Pulse Temperature Height Pulse Oximetry BMI (Body Mass Index)       98 98.3  F (36.8  C) (Temporal) 5' 5.55\" (1.665 m) 98% 34.62 kg/m2        Blood Pressure from Last 3 Encounters:   18 112/78   10/06/17 107/79   17 120/80    Weight from Last 3 Encounters:   18 211 lb 9.6 oz (96 kg)   17 199 lb 6.4 oz (90.4 kg)   04/01/15 195 lb (88.5 kg)            "   We Performed the Following     PHYSICAL THERAPY REFERRAL          Today's Medication Changes          These changes are accurate as of 4/30/18  5:05 PM.  If you have any questions, ask your nurse or doctor.               These medicines have changed or have updated prescriptions.        Dose/Directions    * varenicline 0.5 MG X 11 & 1 MG X 42 tablet   Commonly known as:  CHANTIX STARTING MONTH ALLEN   This may have changed:  Another medication with the same name was added. Make sure you understand how and when to take each.   Used for:  Cigarette nicotine dependence without complication   Changed by:  Henrietta Hooker APRN CNP        Take 0.5 mg tab daily for 3 days, then 0.5 mg tab twice daily for 4 days, then 1 mg twice daily.   Quantity:  53 tablet   Refills:  0       * varenicline 0.5 MG X 11 & 1 MG X 42 tablet   Commonly known as:  CHANTIX STARTING MONTH ALLEN   This may have changed:  You were already taking a medication with the same name, and this prescription was added. Make sure you understand how and when to take each.   Used for:  Tobacco use disorder   Changed by:  Henrietta Hooker APRN CNP        Take 0.5 mg tab daily for 3 days, then 0.5 mg tab twice daily for 4 days, then 1 mg twice daily.   Quantity:  53 tablet   Refills:  0       * varenicline 1 MG tablet   Commonly known as:  CHANTIX   This may have changed:  You were already taking a medication with the same name, and this prescription was added. Make sure you understand how and when to take each.   Used for:  Tobacco use disorder   Changed by:  Henrietta Hooker APRN CNP        Dose:  1 mg   Take 1 tablet (1 mg) by mouth 2 times daily   Quantity:  56 tablet   Refills:  2       * Notice:  This list has 3 medication(s) that are the same as other medications prescribed for you. Read the directions carefully, and ask your doctor or other care provider to review them with you.         Where to get your medicines      These medications  were sent to [facility] - [place] - [address], [address]  [address], [address], [facility] [address]     Phone:  [phone]     varenicline 0.5 MG X 11 & 1 MG X 42 tablet    varenicline 1 MG tablet                Primary Care Provider Office Phone # Fax #    Henrietta Hooker, APRN -[phone]       [address]  [place] [address]        Equal Access to Services     [facility]: Hadii aad ku hadasho Soomaali, waaxda luqadaha, qaybta kaalmada adeegyada, waxay idiin hayaan adeshahbaz grimes . So [facility] [phone].    ATENCIÓN: Si habla español, tiene a stallings disposición servicios gratuitos de asistencia lingüística. [facility] [phone].    We comply with applicable federal civil rights laws and [place] laws. We do not discriminate on the basis of race, color, national origin, age, disability, sex, sexual orientation, or gender identity.            Thank you!     Thank you for choosing [facility]  for your care. Our goal is always to provide you with excellent care. Hearing back from our patients is one way we can continue to improve our services. Please take a few minutes to complete the written survey that you may receive in the mail after your visit with us. Thank you!             Your Updated Medication List - Protect others around you: Learn how to safely use, store and throw away your medicines at www.disposemymeds.org.          This list is accurate as of 4/30/18  5:05 PM.  Always use your most recent med list.                   Brand Name Dispense Instructions for use Diagnosis    albuterol 108 (90 Base) MCG/ACT Inhaler    PROAIR HFA/PROVENTIL HFA/VENTOLIN HFA    1 Inhaler    Inhale 2 puffs into the lungs every 6 hours as needed for shortness of breath / dyspnea or wheezing    Mild intermittent asthma       fenofibrate 145 MG tablet     30 tablet    Take 1 tablet (145 mg) by mouth daily     Hypertriglyceridemia       triamcinolone 0.1 % cream    KENALOG    30 g    Apply topically 2 times daily    Xerosis cutis       * varenicline 0.5 MG X 11 & 1 MG X 42 tablet    CHANTIX STARTING MONTH ALLEN    53 tablet    Take 0.5 mg tab daily for 3 days, then 0.5 mg tab twice daily for 4 days, then 1 mg twice daily.    Cigarette nicotine dependence without complication       * varenicline 0.5 MG X 11 & 1 MG X 42 tablet    CHANTIX STARTING MONTH ALLEN    53 tablet    Take 0.5 mg tab daily for 3 days, then 0.5 mg tab twice daily for 4 days, then 1 mg twice daily.    Tobacco use disorder       * varenicline 1 MG tablet    CHANTIX    56 tablet    Take 1 tablet (1 mg) by mouth 2 times daily    Tobacco use disorder       * Notice:  This list has 3 medication(s) that are the same as other medications prescribed for you. Read the directions carefully, and ask your doctor or other care provider to review them with you.

## 2018-05-01 DIAGNOSIS — Z13.29 SCREENING FOR THYROID DISORDER: ICD-10-CM

## 2018-05-01 DIAGNOSIS — Z13.1 SCREENING FOR DIABETES MELLITUS: ICD-10-CM

## 2018-05-01 DIAGNOSIS — Z13.6 CARDIOVASCULAR SCREENING; LDL GOAL LESS THAN 160: ICD-10-CM

## 2018-05-01 DIAGNOSIS — Z00.00 ENCOUNTER FOR ROUTINE ADULT HEALTH EXAMINATION WITHOUT ABNORMAL FINDINGS: ICD-10-CM

## 2018-05-01 LAB
ALBUMIN SERPL-MCNC: 3.6 G/DL (ref 3.4–5)
ALP SERPL-CCNC: 57 U/L (ref 40–150)
ALT SERPL W P-5'-P-CCNC: 48 U/L (ref 0–70)
ANION GAP SERPL CALCULATED.3IONS-SCNC: 6 MMOL/L (ref 3–14)
AST SERPL W P-5'-P-CCNC: 30 U/L (ref 0–45)
BILIRUB SERPL-MCNC: 0.4 MG/DL (ref 0.2–1.3)
BUN SERPL-MCNC: 16 MG/DL (ref 7–30)
CALCIUM SERPL-MCNC: 8.1 MG/DL (ref 8.5–10.1)
CHLORIDE SERPL-SCNC: 109 MMOL/L (ref 94–109)
CHOLEST SERPL-MCNC: 254 MG/DL
CO2 SERPL-SCNC: 24 MMOL/L (ref 20–32)
CREAT SERPL-MCNC: 0.97 MG/DL (ref 0.66–1.25)
GFR SERPL CREATININE-BSD FRML MDRD: 88 ML/MIN/1.7M2
GLUCOSE SERPL-MCNC: 98 MG/DL (ref 70–99)
HDLC SERPL-MCNC: 44 MG/DL
LDLC SERPL CALC-MCNC: 167 MG/DL
NONHDLC SERPL-MCNC: 210 MG/DL
POTASSIUM SERPL-SCNC: 4.3 MMOL/L (ref 3.4–5.3)
PROT SERPL-MCNC: 7.3 G/DL (ref 6.8–8.8)
SODIUM SERPL-SCNC: 139 MMOL/L (ref 133–144)
TRIGL SERPL-MCNC: 213 MG/DL
TSH SERPL DL<=0.005 MIU/L-ACNC: 0.62 MU/L (ref 0.4–4)

## 2018-05-01 PROCEDURE — 36415 COLL VENOUS BLD VENIPUNCTURE: CPT | Performed by: NURSE PRACTITIONER

## 2018-05-01 PROCEDURE — 84443 ASSAY THYROID STIM HORMONE: CPT | Performed by: NURSE PRACTITIONER

## 2018-05-01 PROCEDURE — 80061 LIPID PANEL: CPT | Performed by: NURSE PRACTITIONER

## 2018-05-01 PROCEDURE — 80053 COMPREHEN METABOLIC PANEL: CPT | Performed by: NURSE PRACTITIONER

## 2018-05-02 NOTE — PROGRESS NOTES
Gosia Villalpando,    Attached are your test results.  -Liver and gallbladder tests are normal. (ALT,AST, Alk phos, bilirubin), kidney function is normal (Cr, GFR), Sodium is normal, Potassium is normal, Calcium is normal, Glucose is normal (diabetes screening test).   -LDL(bad) cholesterol level is elevated, HDL(good) cholesterol level is low and your triglycerides are elevated which can increase your heart disease risk.  A diet high in fat and simple carbohydrates, genetics and being overweight can contribute to this. ADVISE: Exercise, a low fat, low carbohydrate diet, weight control, and omega-3 fatty acids (fish oil) 7563-2088 mg daily are helpful to improve this.  Rechecking your fasting cholesterol panel in 6 months is recommended (Lipid w/ LDL reflex, DX: hyperlipidemia)  -TSH (thyroid stimulating hormone) level is normal which indicates normal thyroid function.   Please contact us if you have any questions.    Henrietta Hooker, CNP

## 2018-05-07 ENCOUNTER — TELEPHONE (OUTPATIENT)
Dept: PEDIATRICS | Facility: CLINIC | Age: 38
End: 2018-05-07

## 2018-05-07 NOTE — TELEPHONE ENCOUNTER
[employer] Biometric Screening form completed by Lora Hooker and mailed to patients home address at [address] [address].  Copy of form   Sent to [facility] to be scanned in patients chart  Priscilla Newby CMA

## 2018-05-07 NOTE — TELEPHONE ENCOUNTER
Received Biometric Screening form to be completed and sent back to the patient at his home address.    Liya Morales CMA

## 2018-05-15 ENCOUNTER — THERAPY VISIT (OUTPATIENT)
Dept: PHYSICAL THERAPY | Facility: CLINIC | Age: 38
End: 2018-05-15
Payer: COMMERCIAL

## 2018-05-15 DIAGNOSIS — G89.29 CHRONIC BILATERAL LOW BACK PAIN WITHOUT SCIATICA: Primary | ICD-10-CM

## 2018-05-15 DIAGNOSIS — M54.50 CHRONIC BILATERAL LOW BACK PAIN WITHOUT SCIATICA: Primary | ICD-10-CM

## 2018-05-15 PROCEDURE — 97110 THERAPEUTIC EXERCISES: CPT | Mod: GP | Performed by: PHYSICAL THERAPIST

## 2018-05-15 PROCEDURE — 97161 PT EVAL LOW COMPLEX 20 MIN: CPT | Mod: GP | Performed by: PHYSICAL THERAPIST

## 2018-05-15 PROCEDURE — 97112 NEUROMUSCULAR REEDUCATION: CPT | Mod: GP | Performed by: PHYSICAL THERAPIST

## 2018-05-15 NOTE — PROGRESS NOTES
[facility] Initial Evaluation  Subjective:  Patient is a 37 year old male presenting with rehab back hpi.   Main Villalpando is a 37 year old male with a lumbar condition.  Condition occurred with:  Insidious onset (not sure what really started but when he sits for 30 min or sore his back is just really stiff and sore. Neck and shoulder not sure what started it but just feels really tight all the time. ).  Condition occurred: for unknown reasons.  This is a chronic condition  4/30/18 date of MD order.    Patient reports pain:  Lumbar spine right and lumbar spine left.  Radiates to:  No radiation.  Pain is described as aching (stiff) and is intermittent and reported as 7/10.  Associated symptoms:  Loss of motion. Pain is the same all the time.  Exacerbated by: standing after sitting for greater then 30 min, or straightening up while sitting. and relieved by nothing (using lumbar support).  Since onset symptoms are gradually worsening.        General health as reported by patient is fair.  Pertinent medical history includes:  Overweight, asthma, smoking and sleep disorder/apnea.  Medical allergies: no.  Other surgeries include:  No.  Current medications:  None as reported by the patient.  Current occupation  manufacturing.  Patient is working in normal job without restrictions.  Primary job tasks include:  Prolonged standing and lifting (walking, standing 40%, sitting 20% walking 40%. ).    Barriers include:  None as reported by the patient.    Red flags:  None as reported by the patient.                        Objective:  System         Lumbar/SI Evaluation    Lumbar Myotomes:  normal            Lumbar DTR's:  not assessed        Lumbar Dermtomes:  not assessed (not assessed. no compliants of numbness)                  Lumbar Palpation:    Tenderness present at Left:    Erector Spinae  Tenderness not present at Left:    Quadratus Lumborum; Piriformis or PSIS  Tenderness present at Right:  Erector Spinae  Tenderness not present at Right:  Quadratus Lumborum; Piriformis or PSIS    Lumbar Provocation:      Left negative with:  PROM hip    Spinal Segmental Conclusions:     Level: Hypo noted at L3, L4, L5 and L2                                                     Isaac Lumbar Evaluation    Posture:  Sitting: fair  Standing: good  Lordosis: WNL  Lateral Shift: no  Correction of Posture: no effect    Movement Loss:  Flexion (Flex): mod and pain  Extension (EXT): min and pain  Side [place] R (SG R): min and pain  Side [place] L (SG L): min and pain  Test Movements:  FIS: During: increases  After: no worse  Pretest Movements: 3/10 PL in low back   Repeat FIS: During: increases  After: no worse  Mechanical Response: IncROM      EIL: During: decreases  After: no better    Repeat EIL: During: decreases  After: better  Mechanical Response: IncROM                                                 ROS    Assessment/Plan:    Patient is a 37 year old male with lumbar complaints.    Patient has the following significant findings with corresponding treatment plan.                Diagnosis 1:  Low back pain Pain -  hot/cold therapy, manual therapy, self management, education, directional preference exercise and home program  Decreased ROM/flexibility - manual therapy, therapeutic exercise, therapeutic activity and home program  Decreased joint mobility - manual therapy, therapeutic exercise, therapeutic activity and home program  Decreased function - therapeutic activities and home program  Impaired posture - neuro re-education, therapeutic activities and home program    Therapy Evaluation Codes:   1) History comprised of:   Personal factors that impact the plan of care:      None.    Comorbidity factors that impact the plan of care are:      Overweight and Smoking.     Medications impacting care: None.  2) Examination of Body Systems comprised of:   Body structures and functions that impact the plan of care:      Lumbar  spine.   Activity limitations that impact the plan of care are:      Lifting, Sitting and Standing.  3) Clinical presentation characteristics are:   Stable/Uncomplicated.  4) Decision-Making    Low complexity using standardized patient assessment instrument and/or measureable assessment of functional outcome.  Cumulative Therapy Evaluation is: Low complexity.    Previous and current functional limitations:  (See Goal Flow Sheet for this information)    Short term and Long term goals: (See Goal Flow Sheet for this information)     Communication ability:  Patient appears to be able to clearly communicate and understand verbal and written communication and follow directions correctly.  Treatment Explanation - The following has been discussed with the patient:   RX ordered/plan of care  Anticipated outcomes  Possible risks and side effects  This patient would benefit from PT intervention to resume normal activities.   Rehab potential is excellent.    Frequency:  1 X week, once daily  Duration:  for 6 weeks  Discharge Plan:  Achieve all LTG.  Independent in home treatment program.  Reach maximal therapeutic benefit.    Please refer to the daily flowsheet for treatment today, total treatment time and time spent performing 1:1 timed codes.

## 2018-05-17 ENCOUNTER — OFFICE VISIT (OUTPATIENT)
Dept: SLEEP MEDICINE | Facility: CLINIC | Age: 38
End: 2018-05-17
Attending: NURSE PRACTITIONER
Payer: COMMERCIAL

## 2018-05-17 VITALS
WEIGHT: 210 LBS | SYSTOLIC BLOOD PRESSURE: 117 MMHG | DIASTOLIC BLOOD PRESSURE: 84 MMHG | HEIGHT: 68 IN | HEART RATE: 71 BPM | OXYGEN SATURATION: 96 % | BODY MASS INDEX: 31.83 KG/M2

## 2018-05-17 DIAGNOSIS — E66.09 CLASS 1 OBESITY DUE TO EXCESS CALORIES WITHOUT SERIOUS COMORBIDITY WITH BODY MASS INDEX (BMI) OF 31.0 TO 31.9 IN ADULT: ICD-10-CM

## 2018-05-17 DIAGNOSIS — R06.00 DYSPNEA AND RESPIRATORY ABNORMALITY: Primary | ICD-10-CM

## 2018-05-17 DIAGNOSIS — R53.81 MALAISE AND FATIGUE: ICD-10-CM

## 2018-05-17 DIAGNOSIS — G47.30 SLEEP APNEA, UNSPECIFIED TYPE: ICD-10-CM

## 2018-05-17 DIAGNOSIS — E66.811 CLASS 1 OBESITY DUE TO EXCESS CALORIES WITHOUT SERIOUS COMORBIDITY WITH BODY MASS INDEX (BMI) OF 31.0 TO 31.9 IN ADULT: ICD-10-CM

## 2018-05-17 DIAGNOSIS — R53.83 MALAISE AND FATIGUE: ICD-10-CM

## 2018-05-17 DIAGNOSIS — R06.89 DYSPNEA AND RESPIRATORY ABNORMALITY: Primary | ICD-10-CM

## 2018-05-17 DIAGNOSIS — R06.83 SNORES: ICD-10-CM

## 2018-05-17 DIAGNOSIS — Z72.820 LACK OF ADEQUATE SLEEP: ICD-10-CM

## 2018-05-17 PROCEDURE — 99244 OFF/OP CNSLTJ NEW/EST MOD 40: CPT | Performed by: PHYSICIAN ASSISTANT

## 2018-05-17 NOTE — MR AVS SNAPSHOT
After Visit Summary   5/17/2018    Main Villalpando    MRN: [id number]           Patient Information     Date Of Birth          1980        Visit Information        Provider Department      5/17/2018 3:00 PM David De La Garza PA [facility]        Today's Diagnoses     Dyspnea and respiratory abnormality    -  1    Snores        Class 1 obesity due to excess calories without serious comorbidity with body mass index (BMI) of 31.0 to 31.9 in adult        Lack of adequate sleep        Sleep apnea, unspecified type        Malaise and fatigue          Care Instructions      Your BMI is Body mass index is 31.93 kg/(m^2).  Weight management is a personal decision.  If you are interested in exploring weight loss strategies, the following discussion covers the approaches that may be successful. Body mass index (BMI) is one way to tell whether you are at a healthy weight, overweight, or obese. It measures your weight in relation to your height.  A BMI of 18.5 to 24.9 is in the healthy range. A person with a BMI of 25 to 29.9 is considered overweight, and someone with a BMI of 30 or greater is considered obese. More than two-thirds of American adults are considered overweight or obese.  Being overweight or obese increases the risk for further weight gain. Excess weight may lead to heart disease and diabetes.  Creating and following plans for healthy eating and physical activity may help you improve your health.  Weight control is part of healthy lifestyle and includes exercise, emotional health, and healthy eating habits. Careful eating habits lifelong are the mainstay of weight control. Though there are significant health benefits from weight loss, long-term weight loss with diet alone may be very difficult to achieve- studies show long-term success with dietary management in less than 10% of people. Attaining a healthy weight may be especially difficult to achieve in those with severe obesity. In  some cases, medications, devices and surgical management might be considered.  What can you do?  If you are overweight or obese and are interested in methods for weight loss, you should discuss this with your provider.     Consider reducing daily calorie intake by 500 calories.     Keep a food journal.     Avoiding skipping meals, consider cutting portions instead.    Diet combined with exercise helps maintain muscle while optimizing fat loss. Strength training is particularly important for building and maintaining muscle mass. Exercise helps reduce stress, increase energy, and improves fitness. Increasing exercise without diet control, however, may not burn enough calories to loose weight.       Start walking three days a week 10-20 minutes at a time    Work towards walking thirty minutes five days a week     Eventually, increase the speed of your walking for 1-2 minutes at time    In addition, we recommend that you review healthy lifestyles and methods for weight loss available through the National Institutes of Health patient information sites:  http://win.niddk.nih.gov/publications/index.htm    And look into health and wellness programs that may be available through your health insurance provider, employer, local community center, or emily club.    Weight management plan: Patient was referred to their PCP to discuss a diet and exercise plan.              Follow-ups after your visit        Your next 10 appointments already scheduled     Jun 04, 2018  4:10 PM CDT   KATELYNN Spine with Katherine Vila, PT   [facility] ([facility]  )    [address]  [facility] [address]   [phone]            Jun 11, 2018  4:40 PM CDT   KATELYNN Spine with Prasad Ramachandran, PT   [facility] ([facility]  )    [address]  [facility] [address]   [phone]            Jun 14, 2018  3:00 PM CDT   Return Sleep Patient with BK BED 5    [facility] ([facility])    [address]  [facility] [address]   [phone]            Mann 15, 2018  1:00 PM CDT   HST Drop Off with MARIAN BRIGGS   [facility] ([facility])    [address]  [facility] [address]   [phone]            Mann 15, 2018  1:30 PM CDT   Return Sleep Patient with LIAT Santana   [facility] ([facility])    [address]  [facility] [address]   [phone]              Future tests that were ordered for you today     Open Future Orders        Priority Expected Expires Ordered    HST-Home Sleep Apnea Test Routine  11/16/2018 5/17/2018            Who to contact     If you have questions or need follow up information about today's clinic visit or your schedule please contact [facility] directly at [phone].  Normal or non-critical lab and imaging results will be communicated to you by ScribbleLivehart, letter or phone within 4 business days after the clinic has received the results. If you do not hear from us within 7 days, please contact the clinic through Zutuxt or phone. If you have a critical or abnormal lab result, we will notify you by phone as soon as possible.  Submit refill requests through BlueSnap or call your pharmacy and they will forward the refill request to us. Please allow 3 business days for your refill to be completed.          Additional Information About Your Visit        ScribbleLivehart Information     BlueSnap gives you secure access to your electronic health record. If you see a primary care provider, you can also send messages to your care team and make appointments. If you have questions, please call your primary care clinic.  If you do not have a primary care provider, please call [phone] and they will assist you.        Care EveryWhere ID     This  "is your Care EveryWhere ID. This could be used by other organizations to access your [facility] records  [id number]        Your Vitals Were     Pulse Height Pulse Oximetry BMI (Body Mass Index)          71 1.727 m (5' 8\") 96% 31.93 kg/m2         Blood Pressure from Last 3 Encounters:   05/17/18 117/84   04/30/18 112/78   10/06/17 107/79    Weight from Last 3 Encounters:   05/17/18 95.3 kg (210 lb)   04/30/18 96 kg (211 lb 9.6 oz)   04/27/17 90.4 kg (199 lb 6.4 oz)              We Performed the Following     SLEEP EVALUATION & MANAGEMENT REFERRAL - ADULT -[facility] [place]  [phone] (Age 15 and up)        Primary Care Provider Office Phone # Fax #    Henrietta Coyle Morro, APRN [phone]       [address]  [place] [address]        Equal Access to Services     ROSETTA ALEX : Hadii aad ku hadasho Soomaali, waaxda luqadaha, qaybta kaalmada adeegyada, waxay idiin hayaan adeeg kharash la'isaac . So [facility] [phone].    ATENCIÓN: Si habla español, tiene a stallings disposición servicios gratuitos de asistencia lingüística. Marliname al [phone].    We comply with applicable federal civil rights laws and [place] laws. We do not discriminate on the basis of race, color, national origin, age, disability, sex, sexual orientation, or gender identity.            Thank you!     Thank you for choosing [facility]  for your care. Our goal is always to provide you with excellent care. Hearing back from our patients is one way we can continue to improve our services. Please take a few minutes to complete the written survey that you may receive in the mail after your visit with us. Thank you!             Your Updated Medication List - Protect others around you: Learn how to safely use, store and throw away your medicines at www.disposemymeds.org.          This list is accurate as of 5/17/18  3:48 PM.  Always use your most recent med list.                   " Brand Name Dispense Instructions for use Diagnosis    albuterol 108 (90 Base) MCG/ACT Inhaler    PROAIR HFA/PROVENTIL HFA/VENTOLIN HFA    1 Inhaler    Inhale 2 puffs into the lungs every 6 hours as needed for shortness of breath / dyspnea or wheezing    Mild intermittent asthma       fenofibrate 145 MG tablet     30 tablet    Take 1 tablet (145 mg) by mouth daily    Hypertriglyceridemia       triamcinolone 0.1 % cream    KENALOG    30 g    Apply topically 2 times daily    Xerosis cutis       * varenicline 0.5 MG X 11 & 1 MG X 42 tablet    CHANTIX STARTING MONTH PAK 53 tablet    Take 0.5 mg tab daily for 3 days, then 0.5 mg tab twice daily for 4 days, then 1 mg twice daily.    Cigarette nicotine dependence without complication       * varenicline 0.5 MG X 11 & 1 MG X 42 tablet    CHANTIX STARTING MONTH ALLEN    53 tablet    Take 0.5 mg tab daily for 3 days, then 0.5 mg tab twice daily for 4 days, then 1 mg twice daily.    Tobacco use disorder       * varenicline 1 MG tablet    CHANTIX    56 tablet    Take 1 tablet (1 mg) by mouth 2 times daily    Tobacco use disorder       * Notice:  This list has 3 medication(s) that are the same as other medications prescribed for you. Read the directions carefully, and ask your doctor or other care provider to review them with you.

## 2018-05-17 NOTE — PATIENT INSTRUCTIONS

## 2018-05-17 NOTE — PROGRESS NOTES
Sleep Consultation:    Date on this visit: 5/17/2018    Main Villalpando is sent by Henrietta Hooker for a sleep consultation regarding sleep disordered breathing.    Primary Physician: Henrietta Hooker       Chief Complaint   Patient presents with     Sleep Problem     consult-snoring, wife says I stop breathing for a moment in my sleep. Feel tired during the day.      Main goes to sleep at 10:00 PM during the week. He wakes up at 4:00 AM with an alarm. He falls asleep in 30 minutes.   He wakes up 1 time a night for 5-10 minutes before falling back to sleep.  Main wakes up to uncertain reasons.  On weekends, Main goes to sleep at 10:00 PM.  He wakes up at 6:00 AM with an alarm. He falls asleep in 30 minutes.  Patient gets 5-6 hours of sleep per night. He does not feel refreshed.     Patient does use electronics in bed and watch TV in bed and does not read in bed.     Main does snore every night and snoring is very loud. Patient does have a regular bed partner. There is report of snoring.  He does have witnessed apneas. They never sleep separately.  Patient sleeps on his back and stomach. He has occasional morning headaches, denies morning confusion and restless legs. Main denies any bruxism, sleep walking, sleep talking, dream enactment, sleep paralysis, cataplexy and hypnogogic/hypnopompic hallucinations.    Main denies difficulty breathing through his nose, claustrophobia and reflux at night.      Main has gained 20 pounds in the last year.  Patient describes themself as a night person.  He would prefer to go to sleep at 11:00 PM and wake up at 7:00 AM.  Patient's [place] Sleepiness score 7/24 inconsistent with excessive  daytime sleepiness.  He has fatigue.     Main does not take naps.  He takes some inadvertant naps after meals.  He denies falling asleep while driving.  Patient was counseled on the importance of driving while alert, to pull over if drowsy, or nap before getting into the  vehicle if sleepy.  He uses 2-3 cups/day of coffee. Last caffeine intake is usually before noon.    Allergies:    No Known Allergies    Medications:    Current Outpatient Prescriptions   Medication Sig Dispense Refill     varenicline (CHANTIX STARTING MONTH ALLEN) 0.5 MG X 11 & 1 MG X 42 tablet Take 0.5 mg tab daily for 3 days, then 0.5 mg tab twice daily for 4 days, then 1 mg twice daily. 53 tablet 0     varenicline (CHANTIX STARTING MONTH ALLEN) 0.5 MG X 11 & 1 MG X 42 tablet Take 0.5 mg tab daily for 3 days, then 0.5 mg tab twice daily for 4 days, then 1 mg twice daily. 53 tablet 0     varenicline (CHANTIX) 1 MG tablet Take 1 tablet (1 mg) by mouth 2 times daily 56 tablet 2     albuterol (PROAIR HFA, PROVENTIL HFA, VENTOLIN HFA) 108 (90 BASE) MCG/ACT inhaler Inhale 2 puffs into the lungs every 6 hours as needed for shortness of breath / dyspnea or wheezing (Patient not taking: Reported on 5/17/2018) 1 Inhaler 3     fenofibrate 145 MG tablet Take 1 tablet (145 mg) by mouth daily (Patient not taking: Reported on 5/17/2018) 30 tablet 5     triamcinolone (KENALOG) 0.1 % cream Apply topically 2 times daily (Patient not taking: Reported on 5/17/2018) 30 g 0       Problem List:  Patient Active Problem List    Diagnosis Date Noted     Class 1 obesity due to excess calories without serious comorbidity with body mass index (BMI) of 31.0 to 31.9 in adult 05/17/2018     Priority: Medium     Chronic bilateral low back pain without sciatica 05/15/2018     Priority: Medium     Impaired fasting glucose 04/18/2014     Priority: Medium     Hypertriglyceridemia 04/18/2014     Priority: Medium      on 4/9/14       Tobacco use disorder 04/18/2014     Priority: Medium     CARDIOVASCULAR SCREENING; LDL GOAL LESS THAN 160 04/09/2014     Priority: Medium     Mild intermittent asthma 04/09/2014     Priority: Medium        Past Medical/Surgical History:  Past Medical History:   Diagnosis Date     Mild intermittent asthma 4/9/2014      Past Surgical History:   Procedure Laterality Date     COLONOSCOPY N/A 10/6/2017    Procedure: COMBINED COLONOSCOPY, SINGLE OR MULTIPLE BIOPSY/POLYPECTOMY BY BIOPSY;;  Surgeon: Duane, William Charles, MD;  Location: [facility] OR     COLONOSCOPY WITH CO2 INSUFFLATION N/A 10/6/2017    Procedure: COLONOSCOPY WITH CO2 INSUFFLATION;  Colonoscopy, Henrietta Hooker, Rectal bleeding, BMI 32.68, [facility] fax [phone](prep already picked up, rescheduled);  Surgeon: Duane, William Charles, MD;  Location: [facility] OR     LASIK BILATERAL Bilateral 2012       Social History:  Social History     Social History     Marital status: Single     Spouse name: N/A     Number of children: N/A     Years of education: N/A     Occupational History           Social History Main Topics     Smoking status: Current Every Day Smoker     Packs/day: 0.50     Types: Cigarettes     Smokeless tobacco: Never Used     Alcohol use Yes     Drug use: No     Sexual activity: Not Currently     Partners: Female     Other Topics Concern     Not on file     Social History Narrative       Family History:  Family History   Problem Relation Age of Onset     Heart Failure Father      Hypertension Father      Hyperlipidemia Father      Asthma Maternal Grandmother      Hyperlipidemia Maternal Grandmother      DIABETES No family hx of      Coronary Artery Disease No family hx of      CEREBROVASCULAR DISEASE No family hx of      Breast Cancer No family hx of      Colon Cancer No family hx of      Prostate Cancer No family hx of      Other Cancer No family hx of      Depression No family hx of      Anxiety Disorder No family hx of      MENTAL ILLNESS No family hx of      Substance Abuse No family hx of      Anesthesia Reaction No family hx of      OSTEOPOROSIS No family hx of      Genetic Disorder No family hx of      Thyroid Disease No family hx of      Obesity No family hx of      Unknown/Adopted No family hx of        Review of Systems:  A  "complete review of systems reviewed by me is negative with the exeption of what has been mentioned in the history of present illness.  CONSTITUTIONAL: POSITIVE for weight gain. NEGATIVE for  fever, chills, sweats or night sweats, drug allergies.  EYES: NEGATIVE for changes in vision, blind spots, double vision.  ENT: NEGATIVE for ear pain, sore throat, sinus pain, post-nasal drip, runny nose, bloody nose  CARDIAC: NEGATIVE for fast heartbeats or fluttering in chest, chest pain or pressure, breathlessness when lying flat, swollen legs or swollen feet.  NEUROLOGIC: NEGATIVE headaches, weakness or numbness in the arms or legs.  DERMATOLOGIC: NEGATIVE for rashes, new moles or change in mole(s)  PULMONARY: NEGATIVE SOB at rest, SOB with activity, dry cough, productive cough, coughing up blood, wheezing or whistling when breathing.    GASTROINTESTINAL: NEGATIVE for nausea or vomitting, loose or watery stools, fat or grease in stools, constipation, abdominal pain, bowel movements black in color or blood noted.  GENITOURINARY: NEGATIVE for pain during urination, blood in urine, urinating more frequently than usual, irregular menstrual periods.  MUSCULOSKELETAL: NEGATIVE for muscle pain, bone or joint pain, swollen joints.  ENDOCRINE: NEGATIVE for increased thirst or urination, diabetes.  LYMPHATIC: NEGATIVE for swollen lymph nodes, lumps or bumps in the breasts or nipple discharge.    Physical Examination:  Vitals: /84  Pulse 71  Ht 1.727 m (5' 8\")  Wt 95.3 kg (210 lb)  SpO2 96%  BMI 31.93 kg/m2  BMI= Body mass index is 31.93 kg/(m^2).      [place] Total Score 5/17/2018   Total score - [place] 7       GENERAL APPEARANCE: alert and no distress  EYES: Eyes grossly normal to inspection, PERRL and conjunctivae and sclerae normal  HENT: ear canals and TM's normal and nose and mouth without ulcers or lesions  NECK: no adenopathy and no asymmetry, masses, or scars  RESP: lungs clear to auscultation - no rales, rhonchi " or wheezes  CV: regular rates and rhythm and normal S1 S2, no S3 or S4  MS: extremities normal- no gross deformities noted  NEURO: Normal strength and tone, mentation intact and speech normal  PSYCH: mentation appears normal and affect normal/bright  Mallampati Class: I.  Tonsillar Stage:     Last Basic Metabolic Panel:  Lab Results   Component Value Date     05/01/2018      Lab Results   Component Value Date    POTASSIUM 4.3 05/01/2018     Lab Results   Component Value Date    CHLORIDE 109 05/01/2018     Lab Results   Component Value Date    ALHAJI 8.1 05/01/2018     Lab Results   Component Value Date    CO2 24 05/01/2018     Lab Results   Component Value Date    BUN 16 05/01/2018     Lab Results   Component Value Date    CR 0.97 05/01/2018     Lab Results   Component Value Date    GLC 98 05/01/2018     TSH   Date Value Ref Range Status   05/01/2018 0.62 0.40 - 4.00 mU/L Final     Impression:  Patient has features and risk factors for possible obstructive sleep apnea including: loud snoring, witnessed apnea, non-refreshing sleep, daytime fatigue and large neck circumference. The STOP-BANG score is 7/8.     Plan:    1. Schedule a Home Sleep Apnea Testing to evaluate for obstructive sleep apnea.    2. He is getting 5-6 hours of sleep a night. We discussed this at length and explained that even if ANGELI is effectively treated he very likely will still be sleepy unless he gets more sleep.  3. Advised him against drowsy driving.    4. Recommend weight management.     Literature provided regarding sleep apnea.      He will follow up with me in approximately two weeks after his sleep study has been completed to review the results and discuss plan of care.       Home Sleep Apnea Testing reviewed.  Obstructive sleep apnea reviewed.  Complications of untreated sleep apnea were reviewed.    David De La Garza PA-C    CC: Henrietta Hooker

## 2018-05-17 NOTE — NURSING NOTE
"Chief Complaint   Patient presents with     Sleep Problem     consult-snoring, wife says I stop breathing for a moment in my sleep. Feel tired during the day.        Initial /84  Pulse 71  Ht 1.727 m (5' 8\")  Wt 95.3 kg (210 lb)  SpO2 96%  BMI 31.93 kg/m2 Estimated body mass index is 31.93 kg/(m^2) as calculated from the following:    Height as of this encounter: 1.727 m (5' 8\").    Weight as of this encounter: 95.3 kg (210 lb).    Medication Reconciliation: complete    Neck circumference: 17 inches / 44 centimeters.      "

## 2018-06-22 ENCOUNTER — OFFICE VISIT (OUTPATIENT)
Dept: PEDIATRICS | Facility: CLINIC | Age: 38
End: 2018-06-22
Payer: COMMERCIAL

## 2018-06-22 VITALS
DIASTOLIC BLOOD PRESSURE: 80 MMHG | OXYGEN SATURATION: 98 % | TEMPERATURE: 97 F | WEIGHT: 214.4 LBS | SYSTOLIC BLOOD PRESSURE: 120 MMHG | BODY MASS INDEX: 32.6 KG/M2 | HEART RATE: 96 BPM

## 2018-06-22 DIAGNOSIS — K21.9 GASTROESOPHAGEAL REFLUX DISEASE WITHOUT ESOPHAGITIS: Primary | ICD-10-CM

## 2018-06-22 DIAGNOSIS — K62.5 RECTAL BLEEDING: ICD-10-CM

## 2018-06-22 LAB
ERYTHROCYTE [DISTWIDTH] IN BLOOD BY AUTOMATED COUNT: 13.6 % (ref 10–15)
HCT VFR BLD AUTO: 46.7 % (ref 40–53)
HGB BLD-MCNC: 15.4 G/DL (ref 13.3–17.7)
MCH RBC QN AUTO: 28.7 PG (ref 26.5–33)
MCHC RBC AUTO-ENTMCNC: 33 G/DL (ref 31.5–36.5)
MCV RBC AUTO: 87 FL (ref 78–100)
PLATELET # BLD AUTO: 257 10E9/L (ref 150–450)
RBC # BLD AUTO: 5.36 10E12/L (ref 4.4–5.9)
WBC # BLD AUTO: 9.6 10E9/L (ref 4–11)

## 2018-06-22 PROCEDURE — 85027 COMPLETE CBC AUTOMATED: CPT | Performed by: NURSE PRACTITIONER

## 2018-06-22 PROCEDURE — 36415 COLL VENOUS BLD VENIPUNCTURE: CPT | Performed by: NURSE PRACTITIONER

## 2018-06-22 PROCEDURE — 99213 OFFICE O/P EST LOW 20 MIN: CPT | Performed by: NURSE PRACTITIONER

## 2018-06-22 NOTE — PROGRESS NOTES
"  SUBJECTIVE:   Main Villalpando is a 37 year old male who presents to clinic today for the following health issues:    1. Possible acid reflux, burning sensation \"needles\" in the throat ongoing for 3 years. Has not taken any medication. Patients has a bitter taste.  It was in the middle of the night     Constipation     Onset: noticed blood in the stools 1 week ago    Description:   Frequency of bowel movements: every day.  Stool consistency: soft    Progression of Symptoms:  improving    Accompanying Signs & Symptoms:  Abdominal pain (cramping?): no   Blood in stool: YES  Rectal pain: no   Nausea/vomiting: no   Weight loss or gain: YES- weight gain   History:   History of abdominal surgery: no     Precipitating factors:   Recent use of narcotics, anticholinergics, calcium channel blockers, antacids, or iron supplements: no   Chronic Laxative Use: no          Therapies Tried and outcome: change in diet  Bright red blood in stool almost every day for a week. Sometimes will be pressure   No pain   No constipation   Stool was not that hard   Sometimes would just drip out       Problem list and histories reviewed & adjusted, as indicated.  Additional history: as documented    Patient Active Problem List   Diagnosis     CARDIOVASCULAR SCREENING; LDL GOAL LESS THAN 160     Mild intermittent asthma     Impaired fasting glucose     Hypertriglyceridemia     Tobacco use disorder     Chronic bilateral low back pain without sciatica     Class 1 obesity due to excess calories without serious comorbidity with body mass index (BMI) of 31.0 to 31.9 in adult     Past Surgical History:   Procedure Laterality Date     COLONOSCOPY N/A 10/6/2017    Procedure: COMBINED COLONOSCOPY, SINGLE OR MULTIPLE BIOPSY/POLYPECTOMY BY BIOPSY;;  Surgeon: Duane, William Charles, MD;  Location: [facility] OR     COLONOSCOPY WITH CO2 INSUFFLATION N/A 10/6/2017    Procedure: COLONOSCOPY WITH CO2 INSUFFLATION;  Holden, Henrietta Hooker, Rectal bleeding, BMI 32.68, [facility] " [facility] fax [phone](prep already picked up, rescheduled);  Surgeon: Duane, William Charles, MD;  Location: [facility] OR     LASIK BILATERAL Bilateral 2012       Social History   Substance Use Topics     Smoking status: Current Some Day Smoker     Packs/day: 0.50     Types: Cigarettes     Smokeless tobacco: Never Used     Alcohol use Yes     Family History   Problem Relation Age of Onset     Heart Failure Father      Hypertension Father      Hyperlipidemia Father      Asthma Maternal Grandmother      Hyperlipidemia Maternal Grandmother      Diabetes No family hx of      Coronary Artery Disease No family hx of      Cerebrovascular Disease No family hx of      Breast Cancer No family hx of      Colon Cancer No family hx of      Prostate Cancer No family hx of      Other Cancer No family hx of      Depression No family hx of      Anxiety Disorder No family hx of      Mental Illness No family hx of      Substance Abuse No family hx of      Anesthesia Reaction No family hx of      Osteoperosis No family hx of      Genetic Disorder No family hx of      Thyroid Disease No family hx of      Obesity No family hx of      Unknown/Adopted No family hx of          Current Outpatient Prescriptions   Medication Sig Dispense Refill     albuterol (PROAIR HFA, PROVENTIL HFA, VENTOLIN HFA) 108 (90 BASE) MCG/ACT inhaler Inhale 2 puffs into the lungs every 6 hours as needed for shortness of breath / dyspnea or wheezing 1 Inhaler 3     fenofibrate 145 MG tablet Take 1 tablet (145 mg) by mouth daily (Patient not taking: Reported on 6/22/2018) 30 tablet 5     triamcinolone (KENALOG) 0.1 % cream Apply topically 2 times daily (Patient not taking: Reported on 5/17/2018) 30 g 0     varenicline (CHANTIX STARTING MONTH PAK) 0.5 MG X 11 & 1 MG X 42 tablet Take 0.5 mg tab daily for 3 days, then 0.5 mg tab twice daily for 4 days, then 1 mg twice daily. (Patient not taking: Reported on 6/22/2018) 53 tablet 0     varenicline (CHANTIX STARTING  MONTH ALLEN) 0.5 MG X 11 & 1 MG X 42 tablet Take 0.5 mg tab daily for 3 days, then 0.5 mg tab twice daily for 4 days, then 1 mg twice daily. (Patient not taking: Reported on 6/22/2018) 53 tablet 0     varenicline (CHANTIX) 1 MG tablet Take 1 tablet (1 mg) by mouth 2 times daily (Patient not taking: Reported on 6/22/2018) 56 tablet 2     No Known Allergies  BP Readings from Last 3 Encounters:   06/22/18 120/80   05/17/18 117/84   04/30/18 112/78    Wt Readings from Last 3 Encounters:   06/22/18 214 lb 6.4 oz (97.3 kg)   05/17/18 210 lb (95.3 kg)   04/30/18 211 lb 9.6 oz (96 kg)                  Labs reviewed in EPIC    Reviewed and updated as needed this visit by clinical staff  Tobacco  Allergies  Meds  Med Hx  Surg Hx  Fam Hx  Soc Hx      Reviewed and updated as needed this visit by Provider         ROS:  CONSTITUTIONAL:NEGATIVE for fever, chills, change in weight  RESP:NEGATIVE for significant cough or SOB  CV: NEGATIVE for chest pain, palpitations or peripheral edema  GI: POSITIVE for heartburn or reflux and melena and NEGATIVE for abdominal pain , nausea, poor appetite, vomiting and weight loss  MUSCULOSKELETAL: NEGATIVE for significant arthralgias or myalgia  ENDOCRINE: NEGATIVE for temperature intolerance, skin/hair changes    OBJECTIVE:     /80 (BP Location: Right arm, Patient Position: Sitting, Cuff Size: Adult Large)  Pulse 96  Temp 97  F (36.1  C) (Temporal)  Wt 214 lb 6.4 oz (97.3 kg)  SpO2 98%  BMI 32.6 kg/m2  Body mass index is 32.6 kg/(m^2).  GENERAL APPEARANCE: alert, active and no distress  NECK: no adenopathy  RESP: lungs clear to auscultation - no rales, rhonchi or wheezes  CV: regular rates and rhythm  ABDOMEN: soft, non-tender  MS: extremities normal- no gross deformities noted  SKIN: no suspicious lesions or rashes  NEURO: Normal strength and tone, mentation intact and speech normal  PSYCH: mentation appears normal and affect normal/bright    Diagnostic Test Results:  Results for  orders placed or performed in visit on 06/22/18   CBC with platelets   Result Value Ref Range    WBC 9.6 4.0 - 11.0 10e9/L    RBC Count 5.36 4.4 - 5.9 10e12/L    Hemoglobin 15.4 13.3 - 17.7 g/dL    Hematocrit 46.7 40.0 - 53.0 %    MCV 87 78 - 100 fl    MCH 28.7 26.5 - 33.0 pg    MCHC 33.0 31.5 - 36.5 g/dL    RDW 13.6 10.0 - 15.0 %    Platelet Count 257 150 - 450 10e9/L       ASSESSMENT/PLAN:     Main was seen today for rectal problem.    Diagnoses and all orders for this visit:    Gastroesophageal reflux disease without esophagitis  -     omeprazole (PRILOSEC) 20 MG CR capsule; Take 1 capsule (20 mg) by mouth daily    Rectal bleeding  -     COLORECTAL SURGERY REFERRAL  -     CBC with platelets        PLAN:   1.   Symptomatic therapy suggested: A high fiber diet with plenty of fluids (up to 8 glasses of water daily) is suggested to relieve these symptoms.  Metamucil, 1 tablespoon once or twice daily can be used to keep bowels regular if needed.    Start on Prilosec once a day   2.  Orders Placed This Encounter   Medications     omeprazole (PRILOSEC) 20 MG CR capsule     Sig: Take 1 capsule (20 mg) by mouth daily     Dispense:  30 capsule     Refill:  1     Orders Placed This Encounter   Procedures     COLORECTAL SURGERY REFERRAL       3. Patient needs to follow up in if no improvement,or sooner if worsening of symptoms or other symptoms develop.  CONSULTATION/REFERRAL to colorectal specialist       See Patient Instructions    MELISA Navas CNP  M [facility]

## 2018-06-22 NOTE — PATIENT INSTRUCTIONS
PLAN:   1.   Symptomatic therapy suggested: A high fiber diet with plenty of fluids (up to 8 glasses of water daily) is suggested to relieve these symptoms.  Metamucil, 1 tablespoon once or twice daily can be used to keep bowels regular if needed.    Start on Prilosec once a day   2.  Orders Placed This Encounter   Medications     omeprazole (PRILOSEC) 20 MG CR capsule     Sig: Take 1 capsule (20 mg) by mouth daily     Dispense:  30 capsule     Refill:  1     Orders Placed This Encounter   Procedures     COLORECTAL SURGERY REFERRAL       3. Patient needs to follow up in if no improvement,or sooner if worsening of symptoms or other symptoms develop.  CONSULTATION/REFERRAL to colorectal specialist       It was a pleasure seeing you today at the [facility]. Thank you for allowing us to care for you today. We truly hope we provided you with the excellent service you deserve. Please let us know if there is anything else we can do for you so we can be sure you are leaving completley satisfied with your care experience.       General information about your clinic   Clinic Hours Lab Hours (Appointments are required)   Mon-Thurs: 7:30 AM - 7 PM Mon-Thurs: 7:30 AM - 7 PM   Fri: 7:30 AM - 5 PM Fri: 7:30 AM - 5 PM        After Hours Nurse Advise & Appts:  [facility] Nurse Advisors: [phone]  [facility] On Call: to make appointments anytime: [phone] On Call Physician: call [phone] and answering service will page the on call physician.        For urgent appointments, please call [phone] and ask for the triage nurse or your care team clinic nurse.  How to contact my care team:  MyChart: [email or web address]   Phone: [phone]   Fax: [phone]       [facility]:   Phone: [phone]  Hours: 8:00 AM - 6:00 PM  Medication Refills:  Call your pharmacy and they will forward the refill to us. Please allow 3 business days for your refills to be completed.       Normal or  non-critical lab and imaging results will be communicated to you by MyChart, letter or phone within 7 days.  If you do not hear from us within 10 days, please call the clinic. If you have a critical or abnormal lab result, we will notify you by phone as soon as possible.       We now have PWIC (Pediatric Walk in Care)  Monday-Friday from 7:30-4. Simply walk in and be seen for your urgent needs like cough, fever, rash, diarrhea or vomiting, pink eye, UTI. No appointments needed. Ask one of the team for more information      -Your Care Team:    Dr. Evaristo Hopkins - Internal Medicine/Pediatrics   Dr. Corie Munson - Family Medicine  Dr. Viki Meadows - Pediatrics  Dr. Alberta Alvarado - Pediatrics  Henrietta Hooker CNP - Family Practice Nurse Practitioner

## 2018-06-22 NOTE — PROGRESS NOTES
Gosia Villalpando,    Attached are your test results.  -Normal red blood cell (hgb) levels, normal white blood cell count and normal platelet levels.   Please contact us if you have any questions.    Henrietta Hooker, CNP

## 2018-06-22 NOTE — MR AVS SNAPSHOT
After Visit Summary   6/22/2018    Main Villalpando    MRN: [id number]           Patient Information     Date Of Birth          1980        Visit Information        Provider Department      6/22/2018 2:30 PM Henrietta Hooker APRN CNP [facility]        Today's Diagnoses     Gastroesophageal reflux disease without esophagitis    -  1    Rectal bleeding          Care Instructions    PLAN:   1.   Symptomatic therapy suggested: A high fiber diet with plenty of fluids (up to 8 glasses of water daily) is suggested to relieve these symptoms.  Metamucil, 1 tablespoon once or twice daily can be used to keep bowels regular if needed.    Start on Prilosec once a day   2.  Orders Placed This Encounter   Medications     omeprazole (PRILOSEC) 20 MG CR capsule     Sig: Take 1 capsule (20 mg) by mouth daily     Dispense:  30 capsule     Refill:  1     Orders Placed This Encounter   Procedures     COLORECTAL SURGERY REFERRAL       3. Patient needs to follow up in if no improvement,or sooner if worsening of symptoms or other symptoms develop.  CONSULTATION/REFERRAL to colorectal specialist       It was a pleasure seeing you today at the [facility]. Thank you for allowing us to care for you today. We truly hope we provided you with the excellent service you deserve. Please let us know if there is anything else we can do for you so we can be sure you are leaving completley satisfied with your care experience.       General information about your clinic   Clinic Hours Lab Hours (Appointments are required)   Mon-Thurs: 7:30 AM - 7 PM Mon-Thurs: 7:30 AM - 7 PM   Fri: 7:30 AM - 5 PM Fri: 7:30 AM - 5 PM        After Hours Nurse Advise & Appts:  [facility] Nurse Advisors: [phone]  [facility] On Call: to make appointments anytime: [phone] On Call Physician: call [phone] and answering service will page the on call physician.        For urgent appointments, please  call [phone] and ask for the triage nurse or your care team clinic nurse.  How to contact my care team:  [facility]: [email or web address]   Phone: [phone]   Fax: [phone]       [facility]:   Phone: [phone]  Hours: 8:00 AM - 6:00 PM  Medication Refills:  Call your pharmacy and they will forward the refill to us. Please allow 3 business days for your refills to be completed.       Normal or non-critical lab and imaging results will be communicated to you by MyChart, letter or phone within 7 days.  If you do not hear from us within 10 days, please call the clinic. If you have a critical or abnormal lab result, we will notify you by phone as soon as possible.       We now have PWIC (Pediatric Walk in Care)  Monday-Friday from 7:30-4. Simply walk in and be seen for your urgent needs like cough, fever, rash, diarrhea or vomiting, pink eye, UTI. No appointments needed. Ask one of the team for more information      -Your Care Team:    Dr. Evaristo Hopkins - Internal Medicine/Pediatrics   Dr. Corie Munson - Family Medicine  Dr. Vkii Meadows - Pediatrics  Dr. Alberta Alvarado - Pediatrics  Henrietta Hooker CNP - Family Practice Nurse Practitioner                           Follow-ups after your visit        Additional Services     COLORECTAL SURGERY REFERRAL       Your provider has referred you to: [facility]: [facility] [place] [phone]   [email or web address]    Referral Reason(s): Rectal Bleeding  Special Concerns: None  This referral is: Elective (week +)  It is OK to leave a message on patient's voicemail.    Please be aware that coverage of these services is subject to the terms and limitations of your health insurance plan.  Call member services at your health plan with any benefit or coverage questions.      Please bring the following with you to your appointment:    (1) Any X-Rays, CTs or MRIs which have been performed.  Contact the facility where they were done to  arrange for  prior to your scheduled appointment.    (2) List of current medications  (3) This referral request   (4) Any documents/labs given to you for this referral                  Your next 10 appointments already scheduled     Jun 28, 2018  3:00 PM CDT   Return Sleep Patient with BK BED 5   [facility]    [address]  [facility] [address]   [phone]            Jun 29, 2018 12:30 PM CDT   HST Drop Off with BK SC DME   [facility]    [address]  [place] [place] [facility] [place] [address]   [phone]            Jun 29, 2018  1:00 PM CDT   Return Sleep Patient with LIAT Santana   [facility]    [address]  [place] [place] [facility] [address]   [phone]              Who to contact     If you have questions or need follow up information about today's clinic visit or your schedule please contact [facility] directly at [phone].  Normal or non-critical lab and imaging results will be communicated to you by MyChart, letter or phone within 4 business days after the clinic has received the results. If you do not hear from us within 7 days, please contact the clinic through MyChart or phone. If you have a critical or abnormal lab result, we will notify you by phone as soon as possible.  Submit refill requests through [employer] or call your pharmacy and they will forward the refill request to us. Please allow 3 business days for your refill to be completed.          Additional Information About Your Visit        [employer] Information     [employer] gives you secure access to your electronic health record. If you see a primary care provider, you can also send messages to your care team and make appointments. If you have questions, please call your primary care clinic.  If  you do not have a primary care provider, please call [phone] and they will assist you.      [employer] is an electronic gateway that provides easy, online access to your medical records. With [employer], you can request a clinic appointment, read your test results, renew a prescription or communicate with your care team.     To access your existing account, please contact your [facility] or call [phone] for assistance.        Care EveryWhere ID     This is your Care EveryWhere ID. This could be used by other organizations to access your [facility] medical records  [id number]        Your Vitals Were     Pulse Temperature Pulse Oximetry BMI (Body Mass Index)          96 97  F (36.1  C) (Temporal) 98% 32.6 kg/m2         Blood Pressure from Last 3 Encounters:   06/22/18 120/80   05/17/18 117/84   04/30/18 112/78    Weight from Last 3 Encounters:   06/22/18 214 lb 6.4 oz (97.3 kg)   05/17/18 210 lb (95.3 kg)   04/30/18 211 lb 9.6 oz (96 kg)              We Performed the Following     CBC with platelets     COLORECTAL SURGERY REFERRAL          Today's Medication Changes          These changes are accurate as of 6/22/18  3:00 PM.  If you have any questions, ask your nurse or doctor.               Start taking these medicines.        Dose/Directions    omeprazole 20 MG CR capsule   Commonly known as:  priLOSEC   Used for:  Gastroesophageal reflux disease without esophagitis   Started by:  Henrietta Hooker APRN CNP        Dose:  20 mg   Take 1 capsule (20 mg) by mouth daily   Quantity:  30 capsule   Refills:  1            Where to get your medicines      These medications were sent to [facility] [place] - [place], [place] - [address], [address]  [address], [address], [facility] [address]     Phone:  [phone]     omeprazole 20 MG CR capsule                Primary Care Provider Office Phone # Fax #    MELISA Navas [phone]  [phone]       [address]  [place] [address]        Equal Access to Services     ROSETTA ALEX : Hadii aad ku hadventurao Soangeloali, waaxda luqadaha, qaybta kaalmada adedavid, rena tabprecious hoang laKarleeisaac matamoros. So [facility] [phone].    ATENCIÓN: Si habla español, tiene a stallings disposición servicios gratuitos de asistencia lingüística. Llame al [phone].    We comply with applicable federal civil rights laws and [place] laws. We do not discriminate on the basis of race, color, national origin, age, disability, sex, sexual orientation, or gender identity.            Thank you!     Thank you for choosing [facility]  for your care. Our goal is always to provide you with excellent care. Hearing back from our patients is one way we can continue to improve our services. Please take a few minutes to complete the written survey that you may receive in the mail after your visit with us. Thank you!             Your Updated Medication List - Protect others around you: Learn how to safely use, store and throw away your medicines at www.disposemymeds.org.          This list is accurate as of 6/22/18  3:00 PM.  Always use your most recent med list.                   Brand Name Dispense Instructions for use Diagnosis    albuterol 108 (90 Base) MCG/ACT Inhaler    PROAIR HFA/PROVENTIL HFA/VENTOLIN HFA    1 Inhaler    Inhale 2 puffs into the lungs every 6 hours as needed for shortness of breath / dyspnea or wheezing    Mild intermittent asthma       fenofibrate 145 MG tablet     30 tablet    Take 1 tablet (145 mg) by mouth daily    Hypertriglyceridemia       omeprazole 20 MG CR capsule    priLOSEC    30 capsule    Take 1 capsule (20 mg) by mouth daily    Gastroesophageal reflux disease without esophagitis       triamcinolone 0.1 % cream    KENALOG    30 g    Apply topically 2 times daily    Xerosis cutis       * varenicline 0.5 MG X 11 & 1 MG X 42 tablet    CHANTIX STARTING MONTH PAK    53  tablet    Take 0.5 mg tab daily for 3 days, then 0.5 mg tab twice daily for 4 days, then 1 mg twice daily.    Cigarette nicotine dependence without complication       * varenicline 0.5 MG X 11 & 1 MG X 42 tablet    CHANTIX STARTING MONTH ALLEN    53 tablet    Take 0.5 mg tab daily for 3 days, then 0.5 mg tab twice daily for 4 days, then 1 mg twice daily.    Tobacco use disorder       * varenicline 1 MG tablet    CHANTIX    56 tablet    Take 1 tablet (1 mg) by mouth 2 times daily    Tobacco use disorder       * Notice:  This list has 3 medication(s) that are the same as other medications prescribed for you. Read the directions carefully, and ask your doctor or other care provider to review them with you.

## 2018-06-22 NOTE — NURSING NOTE
"Chief Complaint   Patient presents with     Rectal Problem     blood in stools noticed it 1 week       Initial /80 (BP Location: Right arm, Patient Position: Sitting, Cuff Size: Adult Large)  Pulse 96  Temp 97  F (36.1  C) (Temporal)  Wt 214 lb 6.4 oz (97.3 kg)  SpO2 98%  BMI 32.6 kg/m2 Estimated body mass index is 32.6 kg/(m^2) as calculated from the following:    Height as of 5/17/18: 5' 8\" (1.727 m).    Weight as of this encounter: 214 lb 6.4 oz (97.3 kg).  Medication Reconciliation: complete      CORI Whatley      "

## 2018-06-23 ASSESSMENT — ASTHMA QUESTIONNAIRES: ACT_TOTALSCORE: 24

## 2018-07-18 ENCOUNTER — TRANSFERRED RECORDS (OUTPATIENT)
Dept: HEALTH INFORMATION MANAGEMENT | Facility: CLINIC | Age: 38
End: 2018-07-18

## 2019-02-15 ENCOUNTER — TELEPHONE (OUTPATIENT)
Dept: PEDIATRICS | Facility: CLINIC | Age: 39
End: 2019-02-15

## 2019-02-15 NOTE — TELEPHONE ENCOUNTER
[facility] CLINICAL DOCUMENTATION    Form Documentation Form or Letter Request    Type or form/letter needing completion: biometric forms  Provider: MELISA Domingo CNP  Has provider seen patient for office visit related to reason for form request? Yes  Date form needed: by 11/30/19  Once completed: Mail form to Name: address on form and sheet does not match, at Address: [address] [place] [address]     CORI Whatley

## 2019-02-18 NOTE — TELEPHONE ENCOUNTER
Last physical 4/27/2018  Would need new physical for this to be completed based on form for this year.

## 2019-02-18 NOTE — TELEPHONE ENCOUNTER
Called patient on home number to inform. Patient states understanding and agrees to plan. Patient will call back to schedule physical for on/after 4/30/19. Patient will bring in new form to visit.  Lorrie GUTIERREZ CMA

## 2019-04-12 ENCOUNTER — ANCILLARY PROCEDURE (OUTPATIENT)
Dept: GENERAL RADIOLOGY | Facility: CLINIC | Age: 39
End: 2019-04-12
Attending: NURSE PRACTITIONER
Payer: COMMERCIAL

## 2019-04-12 ENCOUNTER — OFFICE VISIT (OUTPATIENT)
Dept: PEDIATRICS | Facility: CLINIC | Age: 39
End: 2019-04-12
Payer: COMMERCIAL

## 2019-04-12 VITALS
DIASTOLIC BLOOD PRESSURE: 60 MMHG | TEMPERATURE: 98.1 F | OXYGEN SATURATION: 98 % | HEIGHT: 66 IN | HEART RATE: 73 BPM | BODY MASS INDEX: 32.64 KG/M2 | SYSTOLIC BLOOD PRESSURE: 100 MMHG | WEIGHT: 203.1 LBS

## 2019-04-12 DIAGNOSIS — R06.83 SNORING: ICD-10-CM

## 2019-04-12 DIAGNOSIS — Z00.00 ENCOUNTER FOR ROUTINE ADULT HEALTH EXAMINATION WITHOUT ABNORMAL FINDINGS: Primary | ICD-10-CM

## 2019-04-12 DIAGNOSIS — Z13.29 SCREENING FOR THYROID DISORDER: ICD-10-CM

## 2019-04-12 DIAGNOSIS — R05.9 COUGH: ICD-10-CM

## 2019-04-12 DIAGNOSIS — F17.209 NICOTINE DEPENDENCE WITH NICOTINE-INDUCED DISORDER, UNSPECIFIED NICOTINE PRODUCT TYPE: ICD-10-CM

## 2019-04-12 DIAGNOSIS — Z13.1 SCREENING FOR DIABETES MELLITUS (DM): ICD-10-CM

## 2019-04-12 DIAGNOSIS — Z13.6 CARDIOVASCULAR SCREENING; LDL GOAL LESS THAN 160: ICD-10-CM

## 2019-04-12 DIAGNOSIS — F43.23 ADJUSTMENT REACTION WITH ANXIETY AND DEPRESSION: ICD-10-CM

## 2019-04-12 DIAGNOSIS — E55.9 VITAMIN D INSUFFICIENCY: ICD-10-CM

## 2019-04-12 LAB
ALBUMIN SERPL-MCNC: 3.9 G/DL (ref 3.4–5)
ALP SERPL-CCNC: 65 U/L (ref 40–150)
ALT SERPL W P-5'-P-CCNC: 33 U/L (ref 0–70)
ANION GAP SERPL CALCULATED.3IONS-SCNC: 6 MMOL/L (ref 3–14)
AST SERPL W P-5'-P-CCNC: 20 U/L (ref 0–45)
BILIRUB DIRECT SERPL-MCNC: <0.1 MG/DL (ref 0–0.2)
BILIRUB SERPL-MCNC: 0.2 MG/DL (ref 0.2–1.3)
BUN SERPL-MCNC: 19 MG/DL (ref 7–30)
CALCIUM SERPL-MCNC: 8.6 MG/DL (ref 8.5–10.1)
CHLORIDE SERPL-SCNC: 108 MMOL/L (ref 94–109)
CHOLEST SERPL-MCNC: 267 MG/DL
CO2 SERPL-SCNC: 27 MMOL/L (ref 20–32)
CREAT SERPL-MCNC: 1.03 MG/DL (ref 0.66–1.25)
DEPRECATED CALCIDIOL+CALCIFEROL SERPL-MC: 17 UG/L (ref 20–75)
GFR SERPL CREATININE-BSD FRML MDRD: >90 ML/MIN/{1.73_M2}
GLUCOSE SERPL-MCNC: 93 MG/DL (ref 70–99)
HDLC SERPL-MCNC: 37 MG/DL
LDLC SERPL CALC-MCNC: 193 MG/DL
NONHDLC SERPL-MCNC: 230 MG/DL
POTASSIUM SERPL-SCNC: 4.3 MMOL/L (ref 3.4–5.3)
PROT SERPL-MCNC: 7.6 G/DL (ref 6.8–8.8)
SODIUM SERPL-SCNC: 141 MMOL/L (ref 133–144)
TRIGL SERPL-MCNC: 186 MG/DL
TSH SERPL DL<=0.005 MIU/L-ACNC: 0.4 MU/L (ref 0.4–4)

## 2019-04-12 PROCEDURE — 36415 COLL VENOUS BLD VENIPUNCTURE: CPT | Performed by: NURSE PRACTITIONER

## 2019-04-12 PROCEDURE — 84443 ASSAY THYROID STIM HORMONE: CPT | Performed by: NURSE PRACTITIONER

## 2019-04-12 PROCEDURE — 80076 HEPATIC FUNCTION PANEL: CPT | Performed by: NURSE PRACTITIONER

## 2019-04-12 PROCEDURE — 80061 LIPID PANEL: CPT | Performed by: NURSE PRACTITIONER

## 2019-04-12 PROCEDURE — 99213 OFFICE O/P EST LOW 20 MIN: CPT | Mod: 25 | Performed by: NURSE PRACTITIONER

## 2019-04-12 PROCEDURE — 80048 BASIC METABOLIC PNL TOTAL CA: CPT | Performed by: NURSE PRACTITIONER

## 2019-04-12 PROCEDURE — 71046 X-RAY EXAM CHEST 2 VIEWS: CPT | Performed by: RADIOLOGY

## 2019-04-12 PROCEDURE — 82306 VITAMIN D 25 HYDROXY: CPT | Performed by: NURSE PRACTITIONER

## 2019-04-12 PROCEDURE — 99395 PREV VISIT EST AGE 18-39: CPT | Performed by: NURSE PRACTITIONER

## 2019-04-12 RX ORDER — ERGOCALCIFEROL 1.25 MG/1
50000 CAPSULE, LIQUID FILLED ORAL
Qty: 8 CAPSULE | Refills: 0 | Status: SHIPPED | OUTPATIENT
Start: 2019-04-12 | End: 2019-06-01

## 2019-04-12 RX ORDER — BUPROPION HYDROCHLORIDE 150 MG/1
150 TABLET, EXTENDED RELEASE ORAL 2 TIMES DAILY
Qty: 180 TABLET | Refills: 1 | Status: SHIPPED | OUTPATIENT
Start: 2019-04-12 | End: 2020-02-20

## 2019-04-12 ASSESSMENT — PATIENT HEALTH QUESTIONNAIRE - PHQ9
5. POOR APPETITE OR OVEREATING: NEARLY EVERY DAY
10. IF YOU CHECKED OFF ANY PROBLEMS, HOW DIFFICULT HAVE THESE PROBLEMS MADE IT FOR YOU TO DO YOUR WORK, TAKE CARE OF THINGS AT HOME, OR GET ALONG WITH OTHER PEOPLE: NOT DIFFICULT AT ALL
SUM OF ALL RESPONSES TO PHQ QUESTIONS 1-9: 20
SUM OF ALL RESPONSES TO PHQ QUESTIONS 1-9: 20

## 2019-04-12 ASSESSMENT — ANXIETY QUESTIONNAIRES
7. FEELING AFRAID AS IF SOMETHING AWFUL MIGHT HAPPEN: MORE THAN HALF THE DAYS
6. BECOMING EASILY ANNOYED OR IRRITABLE: MORE THAN HALF THE DAYS
5. BEING SO RESTLESS THAT IT IS HARD TO SIT STILL: MORE THAN HALF THE DAYS
1. FEELING NERVOUS, ANXIOUS, OR ON EDGE: MORE THAN HALF THE DAYS
GAD7 TOTAL SCORE: 16
2. NOT BEING ABLE TO STOP OR CONTROL WORRYING: MORE THAN HALF THE DAYS
3. WORRYING TOO MUCH ABOUT DIFFERENT THINGS: NEARLY EVERY DAY

## 2019-04-12 ASSESSMENT — MIFFLIN-ST. JEOR: SCORE: 1790.04

## 2019-04-12 NOTE — RESULT ENCOUNTER NOTE
Gosia Villalpando,    Attached are your test results.  -Chest xray was normal   Please contact us if you have any questions.    Henrietta Hooker, CNP

## 2019-04-12 NOTE — PROGRESS NOTES
SUBJECTIVE:   CC: Main Villalpando is an 38 year old male who presents for preventative health visit.     Healthy Habits:     Getting at least 3 servings of Calcium per day:  NO    Bi-annual eye exam:  NO    Dental care twice a year:  NO    Sleep apnea or symptoms of sleep apnea:  Daytime drowsiness, Excessive snoring and Sleep apnea    Diet:  Regular (no restrictions) and Breakfast skipped    Frequency of exercise:  None    Taking medications regularly:  Yes    Medication side effects:  Not applicable    PHQ-2 Total Score: 3    Additional concerns today:  No      Depression or Anxiety - New Diagnosis   Duration of complaint: has noticed since new baby   Abnormal Mood Symptoms      Duration: several months     Description:  Depression: YES  Anxiety: YES  Panic attacks: no      Accompanying signs and symptoms: see PHQ-9 and BOB scores    History (similar episodes/previous evaluation): None    Precipitating or alleviating factors: new baby and stress of responsibilities     Therapies tried and outcome: none      Today's PHQ-2 Score: Answers for HPI/ROS submitted by the patient on 4/12/2019   Annual Exam:  If you checked off any problems, how difficult have these problems made it for you to do your work, take care of things at home, or get along with other people?: Not difficult at all  PHQ9 TOTAL SCORE: 20    PHQ-2 ( 1999 [employer]) 4/12/2019   Q1: Little interest or pleasure in doing things 2   Q2: Feeling down, depressed or hopeless 2   PHQ-2 Score 4   Q1: Little interest or pleasure in doing things Several days   Q2: Feeling down, depressed or hopeless More than half the days   PHQ-2 Score 3       Abuse: Current or Past(Physical, Sexual or Emotional)- No  Do you feel safe in your environment? Yes    Social History     Tobacco Use     Smoking status: Current Every Day Smoker     Packs/day: 0.50     Types: Cigarettes     Smokeless tobacco: Never Used   Substance Use Topics     Alcohol use: Yes     If you drink alcohol do you  typically have >3 drinks per day or >7 drinks per week? No    Alcohol Use 4/12/2019   Prescreen: >3 drinks/day or >7 drinks/week? Not Applicable   Prescreen: >3 drinks/day or >7 drinks/week? -   No flowsheet data found.    Last PSA: No results found for: PSA    Reviewed orders with patient. Reviewed health maintenance and updated orders accordingly - Yes  Labs reviewed in EPIC  BP Readings from Last 3 Encounters:   04/12/19 100/60   06/22/18 120/80   05/17/18 117/84    Wt Readings from Last 3 Encounters:   04/12/19 92.1 kg (203 lb 1.6 oz)   06/22/18 97.3 kg (214 lb 6.4 oz)   05/17/18 95.3 kg (210 lb)                  Patient Active Problem List   Diagnosis     CARDIOVASCULAR SCREENING; LDL GOAL LESS THAN 160     Mild intermittent asthma     Impaired fasting glucose     Hypertriglyceridemia     Tobacco use disorder     Chronic bilateral low back pain without sciatica     Class 1 obesity due to excess calories without serious comorbidity with body mass index (BMI) of 31.0 to 31.9 in adult     Past Surgical History:   Procedure Laterality Date     COLONOSCOPY N/A 10/6/2017    Procedure: COMBINED COLONOSCOPY, SINGLE OR MULTIPLE BIOPSY/POLYPECTOMY BY BIOPSY;;  Surgeon: Duane, William Charles, MD;  Location: [facility] OR     COLONOSCOPY WITH CO2 INSUFFLATION N/A 10/6/2017    Procedure: COLONOSCOPY WITH CO2 INSUFFLATION;  Colonoscopy, Henrietta Hooker, Rectal bleeding, BMI 32.68, [facility] fax [phone](prep already picked up, rescheduled);  Surgeon: Duane, William Charles, MD;  Location: [facility] OR     LASIK BILATERAL Bilateral 2012       Social History     Tobacco Use     Smoking status: Current Every Day Smoker     Packs/day: 0.50     Types: Cigarettes     Smokeless tobacco: Never Used   Substance Use Topics     Alcohol use: Yes     Family History   Problem Relation Age of Onset     Heart Failure Father      Hypertension Father      Hyperlipidemia Father      Asthma Maternal Grandmother      Hyperlipidemia Maternal  Grandmother      Diabetes No family hx of      Coronary Artery Disease No family hx of      Cerebrovascular Disease No family hx of      Breast Cancer No family hx of      Colon Cancer No family hx of      Prostate Cancer No family hx of      Other Cancer No family hx of      Depression No family hx of      Anxiety Disorder No family hx of      Mental Illness No family hx of      Substance Abuse No family hx of      Anesthesia Reaction No family hx of      Osteoporosis No family hx of      Genetic Disorder No family hx of      Thyroid Disease No family hx of      Obesity No family hx of      Unknown/Adopted No family hx of          Current Outpatient Medications   Medication Sig Dispense Refill     albuterol (PROAIR HFA, PROVENTIL HFA, VENTOLIN HFA) 108 (90 BASE) MCG/ACT inhaler Inhale 2 puffs into the lungs every 6 hours as needed for shortness of breath / dyspnea or wheezing 1 Inhaler 3     fenofibrate 145 MG tablet Take 1 tablet (145 mg) by mouth daily (Patient not taking: Reported on 6/22/2018) 30 tablet 5     omeprazole (PRILOSEC) 20 MG CR capsule Take 1 capsule (20 mg) by mouth daily (Patient not taking: Reported on 4/12/2019) 30 capsule 1     triamcinolone (KENALOG) 0.1 % cream Apply topically 2 times daily (Patient not taking: Reported on 5/17/2018) 30 g 0     varenicline (CHANTIX STARTING MONTH ALLEN) 0.5 MG X 11 & 1 MG X 42 tablet Take 0.5 mg tab daily for 3 days, then 0.5 mg tab twice daily for 4 days, then 1 mg twice daily. (Patient not taking: Reported on 6/22/2018) 53 tablet 0     varenicline (CHANTIX STARTING MONTH ALLEN) 0.5 MG X 11 & 1 MG X 42 tablet Take 0.5 mg tab daily for 3 days, then 0.5 mg tab twice daily for 4 days, then 1 mg twice daily. (Patient not taking: Reported on 6/22/2018) 53 tablet 0     varenicline (CHANTIX) 1 MG tablet Take 1 tablet (1 mg) by mouth 2 times daily (Patient not taking: Reported on 6/22/2018) 56 tablet 2     No Known Allergies    Reviewed and updated as needed this visit  by clinical staff  Tobacco  Med Hx  Surg Hx  Fam Hx  Soc Hx        Reviewed and updated as needed this visit by Provider        Past Medical History:   Diagnosis Date     Mild intermittent asthma 4/9/2014      Past Surgical History:   Procedure Laterality Date     COLONOSCOPY N/A 10/6/2017    Procedure: COMBINED COLONOSCOPY, SINGLE OR MULTIPLE BIOPSY/POLYPECTOMY BY BIOPSY;;  Surgeon: Duane, William Charles, MD;  Location: [facility]     COLONOSCOPY WITH CO2 INSUFFLATION N/A 10/6/2017    Procedure: COLONOSCOPY WITH CO2 INSUFFLATION;  Colonoscopy, Henrietta Hooker, Rectal bleeding, BMI 32.68, [facility] fax [phone](prep already picked up, rescheduled);  Surgeon: Duane, William Charles, MD;  Location: [facility]     LASIK BILATERAL Bilateral 2012       Review of Systems  ROS:  CONSTITUTIONAL:NEGATIVE for fever, chills, change in weight  INTEGUMENTARY/SKIN: NEGATIVE for worrisome rashes, moles or lesions  EYES: NEGATIVE for vision changes or irritation  ENT: NEGATIVE for ear, mouth and throat problems  RESP:POSITIVE for cough-non productive and Hx asthma and feels like coughing more often  NEGATIVE for cough-productive, hemoptysis and SOB/dyspnea  CV: NEGATIVE for chest pain, palpitations or peripheral edema  GI: NEGATIVE for nausea, abdominal pain, heartburn, or change in bowel habits   male: negative for dysuria, hematuria, decreased urinary stream, erectile dysfunction, urethral discharge  MUSCULOSKELETAL:NEGATIVE for significant arthralgias or myalgia  NEURO: NEGATIVE for weakness, dizziness or paresthesias  ENDOCRINE: NEGATIVE for temperature intolerance, skin/hair changes  HEME/ALLERGY/IMMUNE: NEGATIVE for allergies  PSYCHIATRIC: POSITIVE foranxiety, depressed mood and stress with new baby. Feels stressed out. Is the universal  for his family   and NEGATIVE forthoughts of hurting someone else and thoughts of self harm  PHQ-9 SCORE 4/12/2019 4/12/2019   PHQ-9 Total Score MyChart - 20 (Severe  "depression)   PHQ-9 Total Score 20 20       BOB-7 SCORE 4/12/2019   Total Score 16       [facility] Follow-up to PHQ 4/12/2019 4/12/2019   PHQ-9 9. Suicide Ideation past 2 weeks Not at all Not at all         OBJECTIVE:   /60 (BP Location: Right arm, Patient Position: Sitting, Cuff Size: Adult Large)   Pulse 73   Temp 98.1  F (36.7  C) (Temporal)   Ht 1.67 m (5' 5.75\")   Wt 92.1 kg (203 lb 1.6 oz)   SpO2 98%   BMI 33.03 kg/m      Physical Exam   Wt Readings from Last 4 Encounters:   04/12/19 92.1 kg (203 lb 1.6 oz)   06/22/18 97.3 kg (214 lb 6.4 oz)   05/17/18 95.3 kg (210 lb)   04/30/18 96 kg (211 lb 9.6 oz)       GENERAL: healthy, alert and no distress  EYES: Eyes grossly normal to inspection, PERRL and conjunctivae and sclerae normal  HENT: ear canals and TM's normal, nose and mouth without ulcers or lesions  NECK: no adenopathy, no asymmetry, masses, or scars and thyroid normal to palpation  RESP: lungs clear to auscultation - no rales, rhonchi or wheezes  CV: regular rates and rhythm, no murmur, click or rub, peripheral pulses strong and no peripheral edema  ABDOMEN: soft, nontender, no hepatosplenomegaly, no masses and bowel sounds normal   (male): normal male genitalia without lesions or urethral discharge, no hernia  RECTAL: deferred  MS: no gross musculoskeletal defects noted, no edema  SKIN: no suspicious lesions or rashes  NEURO: Normal strength and tone, mentation intact and speech normal  PSYCH: Alert, oriented, thought content appropriate, affect: redirectable, when questioned about suicide, the patient expresses no suicidal ideation, no homicidal ideation,mentation appears normal., patient appears normal, anxious, oriented X 3  MENTAL STATUS EXAM:  Appearance/Behavior: No apparent distress, Casually groomed and Dressed appropriately for weather  Speech: Normal  Mood/Affect: depressed affect and anxiety  Insight: Adequate  LYMPH: no cervical, supraclavicular, axillary, or inguinal " adenopathy    Diagnostic Test Results:  Results for orders placed or performed in visit on 04/12/19   Lipid panel reflex to direct LDL Fasting   Result Value Ref Range    Cholesterol 267 (H) <200 mg/dL    Triglycerides 186 (H) <150 mg/dL    HDL Cholesterol 37 (L) >39 mg/dL    LDL Cholesterol Calculated 193 (H) <100 mg/dL    Non HDL Cholesterol 230 (H) <130 mg/dL   Basic metabolic panel   Result Value Ref Range    Sodium 141 133 - 144 mmol/L    Potassium 4.3 3.4 - 5.3 mmol/L    Chloride 108 94 - 109 mmol/L    Carbon Dioxide 27 20 - 32 mmol/L    Anion Gap 6 3 - 14 mmol/L    Glucose 93 70 - 99 mg/dL    Urea Nitrogen 19 7 - 30 mg/dL    Creatinine 1.03 0.66 - 1.25 mg/dL    GFR Estimate >90 >60 mL/min/[1.73_m2]    GFR Estimate If Black >90 >60 mL/min/[1.73_m2]    Calcium 8.6 8.5 - 10.1 mg/dL   TSH with free T4 reflex   Result Value Ref Range    TSH 0.40 0.40 - 4.00 mU/L     Recent Results (from the past 744 hour(s))   XR Chest 2 Views    Narrative    EXAMINATION: XR CHEST 2 VW, 4/12/2019 9:39 AM    INDICATION: Cough    COMPARISON: 4/27/2017    FINDINGS: PA and lateral radiographs of the chest.    Regular is midline. The cardiomediastinal silhouette is within normal  there is no pleural effusion. No Pneumothorax. No focal airspace  opacity.    Is upper abdomen is grossly unremarkable. Soft tissues unremarkable.  No acute bony abnormality is.      Impression    IMPRESSION: No acute cardiac or pulmonary abnormalities.       ASSESSMENT/PLAN:   Main was seen today for physical.    Diagnoses and all orders for this visit:    Encounter for routine adult health examination without abnormal findings  -     Lipid panel reflex to direct LDL Fasting  -     Basic metabolic panel  -     TSH with free T4 reflex  -     JUST IN CASE  -     Vitamin D Deficiency  -     Hepatic panel    CARDIOVASCULAR SCREENING; LDL GOAL LESS THAN 160  -     Lipid panel reflex to direct LDL Fasting  -     Lipid panel reflex to direct LDL Fasting;  Future    Screening for diabetes mellitus (DM)  -     Basic metabolic panel  -     JUST IN CASE    Screening for thyroid disorder  -     TSH with free T4 reflex  -     JUST IN CASE    Adjustment reaction with anxiety and depression  --     buPROPion (WELLBUTRIN SR) 150 MG 12 hr tablet; Take 1 tablet (150 mg) by mouth 2 times daily  Initiate medication with wellbutrin   Reviewed concept of depression as function of biochemical imbalance of neurotransmitters/rationale for treatment.  Risks and benefits of medication(s) reviewed with patient.  Questions answered.  Counseling advised  Followup appointment in 1 month(s)  Patient instructed to call for significant side effects medications or problems  Patient advised immediate presentation to hospital for suicidal thought, etc.      Nicotine dependence with nicotine-induced disorder, unspecified nicotine product type  -     buPROPion (WELLBUTRIN SR) 150 MG 12 hr tablet; Take 1 tablet (150 mg) by mouth 2 times daily    Cough  -     XR Chest 2 Views; Future    Snoring  -     SLEEP EVALUATION & MANAGEMENT REFERRAL - [facility]  [phone] (Age 15 and up); Future      PLAN:   1.   Symptomatic therapy suggested: start the wellbutrin.  2.    buPROPion (WELLBUTRIN SR) 150 MG 12 hr tablet     Sig: Take 1 tablet (150 mg) by mouth 2 times daily     Dispense:  180 tablet     Refill:  1     Orders Placed This Encounter   Procedures     XR Chest 2 Views     Lipid panel reflex to direct LDL Fasting     Basic metabolic panel     TSH with free T4 reflex     JUST IN CASE     Vitamin D Deficiency     SLEEP EVALUATION & MANAGEMENT REFERRAL - [facility]  [phone] (Age 15 and up)       3. Patient needs to follow up in if no improvement,or sooner if worsening of symptoms or other symptoms develop.  FURTHER TESTING:       - chest xray   CONSULTATION/REFERRAL to Sleep center   Work on weight loss  Regular  "exercise  Follow up in 1 month.  Follow up office visit in one year for annual health maintenance exam, sooner PRN.  Initiated consultation with PATO Montelongo, Behavioral Health Clinician for mental health counseling.     COUNSELING:   Reviewed preventive health counseling, as reflected in patient instructions  Special attention given to:        Regular exercise       Healthy diet/nutrition    BP Readings from Last 1 Encounters:   04/12/19 100/60     Estimated body mass index is 33.03 kg/m  as calculated from the following:    Height as of this encounter: 1.67 m (5' 5.75\").    Weight as of this encounter: 92.1 kg (203 lb 1.6 oz).      Weight management plan: Discussed healthy diet and exercise guidelines     reports that he has been smoking cigarettes.  He has been smoking about 0.50 packs per day. He has never used smokeless tobacco.  Tobacco Cessation Action Plan: Pharmacotherapies : Zyban/Wellbutrin    Counseling Resources:  ATP IV Guidelines  Pooled Cohorts Equation Calculator  FRAX Risk Assessment  ICSI Preventive Guidelines  Dietary Guidelines for Americans, 2010  USDA's MyPlate  ASA Prophylaxis  Lung CA Screening    Henrietta Hooker, MELISA CNP  [facility]  "

## 2019-04-12 NOTE — LETTER
February 10, 2020      Main Villalpando  [id number] [address]            Dear Main Villalpando:    This is to remind you that your provider wanted you to return to the clinic for lab test.    If you are coming in for Lipids and/or Glucose testing please fast for 10-12 hours. Morning medications can be taken with water.    You may call our office to schedule an appointment.    Please disregard this notice if you have already had your labs drawn or made an            appointment.    Sincerely,        Henrietta Hooker CNP

## 2019-04-12 NOTE — NURSING NOTE
"Chief Complaint   Patient presents with     Physical     TOMÁS-coffee with cream, sugar today       Initial /60 (BP Location: Right arm, Patient Position: Sitting, Cuff Size: Adult Large)   Pulse 73   Temp 98.1  F (36.7  C) (Temporal)   Ht 1.67 m (5' 5.75\")   Wt 92.1 kg (203 lb 1.6 oz)   SpO2 98%   BMI 33.03 kg/m   Estimated body mass index is 33.03 kg/m  as calculated from the following:    Height as of this encounter: 1.67 m (5' 5.75\").    Weight as of this encounter: 92.1 kg (203 lb 1.6 oz).  Medication Reconciliation: complete      CORI Whatley      "

## 2019-04-12 NOTE — LETTER
November 5, 2019      Main Villalpando  [id number] [address] [address]            Dear Main Villalpando:    This is to remind you that your provider wanted you to return to the clinic for lab test.    If you are coming in for Lipids and/or Glucose testing please fast for 10-12 hours. Morning medications can be taken with water.    You may call our office to schedule an appointment.    Please disregard this notice if you have already had your labs drawn or made an            appointment.    Sincerely,        Henrietta Hooker CNP

## 2019-04-12 NOTE — PATIENT INSTRUCTIONS
PLAN:   1.   Symptomatic therapy suggested: start the wellbutrin.  2.    buPROPion (WELLBUTRIN SR) 150 MG 12 hr tablet     Sig: Take 1 tablet (150 mg) by mouth 2 times daily     Dispense:  180 tablet     Refill:  1     Orders Placed This Encounter   Procedures     XR Chest 2 Views     Lipid panel reflex to direct LDL Fasting     Basic metabolic panel     TSH with free T4 reflex     JUST IN CASE     Vitamin D Deficiency     SLEEP EVALUATION & MANAGEMENT REFERRAL - [facility]  [phone] (Age 15 and up)       3. Patient needs to follow up in if no improvement,or sooner if worsening of symptoms or other symptoms develop.  FURTHER TESTING:       - chest xray   CONSULTATION/REFERRAL to Sleep center   Work on weight loss  Regular exercise  Follow up in 1 month.  Follow up office visit in one year for annual health maintenance exam, sooner PRN.  Initiated consultation with PATO Montelongo, Behavioral Health Clinician for mental health counseling.       Preventive Health Recommendations  Male Ages 26 - 39    Yearly exam:             See your health care provider every year in order to  o   Review health changes.   o   Discuss preventive care.    o   Review your medicines if your doctor has prescribed any.    You should be tested each year for STDs (sexually transmitted diseases), if you re at risk.     After age 35, talk to your provider about cholesterol testing. If you are at risk for heart disease, have your cholesterol tested at least every 5 years.     If you are at risk for diabetes, you should have a diabetes test (fasting glucose).  Shots: Get a flu shot each year. Get a tetanus shot every 10 years.     Nutrition:    Eat at least 5 servings of fruits and vegetables daily.     Eat whole-grain bread, whole-wheat pasta and brown rice instead of white grains and rice.     Get adequate Calcium and Vitamin D.     Lifestyle    Exercise for at least 150 minutes a week (30 minutes a  day, 5 days a week). This will help you control your weight and prevent disease.     Limit alcohol to one drink per day.     No smoking.     Wear sunscreen to prevent skin cancer.     See your dentist every six months for an exam and cleaning.

## 2019-04-12 NOTE — RESULT ENCOUNTER NOTE
Gosia Villalpando,    Attached are your test results.  -Liver and gallbladder tests (ALT,AST, Alk phos,bilirubin) are normal.  Vitamin D level is low and oral supplementation should be started.  ADVISE: start script for Vitamin D once a week for 8 weeks and then Vitamin D3 over the counter 2000 IU daily to maintain levels. I will send the script in for you.   In 2 months, please schedule a lab only appointment to recheck Vitamin D levels (Vit D deficiency, DX: Vitamin D Deficiency)   Please contact us if you have any questions.    Henrietta Hooker, CNP

## 2019-04-12 NOTE — RESULT ENCOUNTER NOTE
Gosia Villalpando,    Attached are your test results.  -LDL(bad) cholesterol level is elevated, and your triglycerides are elevated which can increase your heart disease risk.  A diet high in fat and simple carbohydrates, genetics and being overweight can contribute to this. ADVISE: exercising 150 minutes of aerobic exercise per week (30 minutes for 5 days per week or 50 minutes for 3 days per week are options), eating a low saturated fat/low carbohydrate diet, and omega-3 fatty acids (fish oil) 9538-7318 mg daily are helpful to improve this. In 3 months, you should recheck your fasting cholesterol panel by scheduling a lab-only appointment.  -Kidney function is normal (Cr, GFR), Sodium is normal, Potassium is normal, Calcium is normal, Glucose is normal.   -TSH (thyroid stimulating hormone) level is normal which indicates normal thyroid function.   Please contact us if you have any questions.    Henrietta Hooker, CNP

## 2019-04-13 ASSESSMENT — ASTHMA QUESTIONNAIRES: ACT_TOTALSCORE: 20

## 2019-04-13 ASSESSMENT — ANXIETY QUESTIONNAIRES: GAD7 TOTAL SCORE: 16

## 2019-04-19 ENCOUNTER — TELEPHONE (OUTPATIENT)
Dept: PEDIATRICS | Facility: CLINIC | Age: 39
End: 2019-04-19

## 2019-04-19 NOTE — LETTER
May 6, 2019      Main Villalpando  [id number] [address] [address]      Dear Main Villalpando,    In order to ensure that we are providing the best quality care, we would like to remind you that you are due for a Return appointment with Lora Hooker CNP around 5/12/19.  She has also referred you to [facility].      We have been unable to reach you by phone. Please call your clinic or use [facility] to make an appointment with your provider before you run out of medication. This will prevent a delay in your next refill. Please let us know if you have any questions and we would be happy to help.     Thank you for trusting us with your care.    Sincerely,     [facility]  [phone]

## 2019-04-19 NOTE — TELEPHONE ENCOUNTER
Left message for patient to return clinic call regarding scheduling. Patient needs a Return  appointment for 1 month med check with Lora Hooker CNP on or around 5/12/19 and  New patient appointment with [facility]. Number to clinic and Mychart option given, please assist in scheduling once patient returns clinic call.     Call Center OKAY TO SCHEDULE.    Thanks,   Kaylynn Blanco  Primary Care   [facility]

## 2019-05-21 ENCOUNTER — TELEPHONE (OUTPATIENT)
Dept: PEDIATRICS | Facility: CLINIC | Age: 39
End: 2019-05-21

## 2019-05-21 NOTE — TELEPHONE ENCOUNTER
1st attempt-called and left VM for patient to call back, forms were not signed by patient. Patient will need to come in to sign forms before provider can complete.    [facility] CLINICAL DOCUMENTATION    Form Documentation Form or Letter Request    Type or form/letter needing completion: biometric forms  Provider: MELISA Domingo CNP  Has provider seen patient for office visit related to reason for form request? Yes  Date form needed: submitted by 11/30/19  Once completed: Mail form to Name: Main Villalpando, at Address: [address] [place] [id number]    CORI Whatley

## 2019-05-24 NOTE — TELEPHONE ENCOUNTER
Called patient, relayed message & patient verbalized understanding.   He did print two new forms and will sign them and bring them in on Monday. We can get rid of our current forms.   Talia Tang RN

## 2019-05-24 NOTE — TELEPHONE ENCOUNTER
2nd attempt-Called and left VM for patient to call back to discuss forms that were not signed by patient.    CORI Whatley

## 2019-06-18 ENCOUNTER — TELEPHONE (OUTPATIENT)
Dept: PEDIATRICS | Facility: CLINIC | Age: 39
End: 2019-06-18

## 2019-06-18 NOTE — TELEPHONE ENCOUNTER
[facility] CLINICAL DOCUMENTATION    Form Documentation Form or Letter Request    Type or form/letter needing completion: biometric screening form  Provider: MELISA Domingo CNP  Has provider seen patient for office visit related to reason for form request? Yes  Date form needed: before 11/30/19  Once completed: Mail form to Name: Main Villalpando at Address: [address] [place] [address], [place] [address]     CORI Whatley

## 2020-02-17 ASSESSMENT — PATIENT HEALTH QUESTIONNAIRE - PHQ9
SUM OF ALL RESPONSES TO PHQ QUESTIONS 1-9: 22
10. IF YOU CHECKED OFF ANY PROBLEMS, HOW DIFFICULT HAVE THESE PROBLEMS MADE IT FOR YOU TO DO YOUR WORK, TAKE CARE OF THINGS AT HOME, OR GET ALONG WITH OTHER PEOPLE: VERY DIFFICULT
SUM OF ALL RESPONSES TO PHQ QUESTIONS 1-9: 22

## 2020-02-20 ENCOUNTER — OFFICE VISIT (OUTPATIENT)
Dept: PEDIATRICS | Facility: CLINIC | Age: 40
End: 2020-02-20
Payer: COMMERCIAL

## 2020-02-20 VITALS
TEMPERATURE: 98 F | OXYGEN SATURATION: 97 % | DIASTOLIC BLOOD PRESSURE: 83 MMHG | BODY MASS INDEX: 34.23 KG/M2 | HEIGHT: 66 IN | WEIGHT: 213 LBS | HEART RATE: 90 BPM | SYSTOLIC BLOOD PRESSURE: 126 MMHG

## 2020-02-20 DIAGNOSIS — R53.83 FATIGUE, UNSPECIFIED TYPE: ICD-10-CM

## 2020-02-20 DIAGNOSIS — Z23 NEED FOR PNEUMOCOCCAL VACCINATION: ICD-10-CM

## 2020-02-20 DIAGNOSIS — G47.10 HYPERSOMNIA: ICD-10-CM

## 2020-02-20 DIAGNOSIS — F32.2 SEVERE DEPRESSION (H): ICD-10-CM

## 2020-02-20 DIAGNOSIS — F41.1 GAD (GENERALIZED ANXIETY DISORDER): ICD-10-CM

## 2020-02-20 DIAGNOSIS — E55.9 VITAMIN D INSUFFICIENCY: ICD-10-CM

## 2020-02-20 DIAGNOSIS — R06.83 SNORES: ICD-10-CM

## 2020-02-20 DIAGNOSIS — E78.1 HYPERTRIGLYCERIDEMIA: Chronic | ICD-10-CM

## 2020-02-20 DIAGNOSIS — J45.20 MILD INTERMITTENT ASTHMA WITHOUT COMPLICATION: Chronic | ICD-10-CM

## 2020-02-20 DIAGNOSIS — Z00.00 ROUTINE GENERAL MEDICAL EXAMINATION AT A HEALTH CARE FACILITY: Primary | ICD-10-CM

## 2020-02-20 DIAGNOSIS — Z13.1 SCREENING FOR DIABETES MELLITUS: ICD-10-CM

## 2020-02-20 DIAGNOSIS — Z11.4 ENCOUNTER FOR SCREENING FOR HIV: ICD-10-CM

## 2020-02-20 DIAGNOSIS — F17.200 TOBACCO USE DISORDER: ICD-10-CM

## 2020-02-20 PROCEDURE — 90471 IMMUNIZATION ADMIN: CPT | Performed by: INTERNAL MEDICINE

## 2020-02-20 PROCEDURE — 96127 BRIEF EMOTIONAL/BEHAV ASSMT: CPT | Performed by: INTERNAL MEDICINE

## 2020-02-20 PROCEDURE — 90732 PPSV23 VACC 2 YRS+ SUBQ/IM: CPT | Performed by: INTERNAL MEDICINE

## 2020-02-20 PROCEDURE — 99385 PREV VISIT NEW AGE 18-39: CPT | Mod: 25 | Performed by: INTERNAL MEDICINE

## 2020-02-20 PROCEDURE — 99214 OFFICE O/P EST MOD 30 MIN: CPT | Mod: 25 | Performed by: INTERNAL MEDICINE

## 2020-02-20 ASSESSMENT — ANXIETY QUESTIONNAIRES
5. BEING SO RESTLESS THAT IT IS HARD TO SIT STILL: NEARLY EVERY DAY
6. BECOMING EASILY ANNOYED OR IRRITABLE: NEARLY EVERY DAY
1. FEELING NERVOUS, ANXIOUS, OR ON EDGE: MORE THAN HALF THE DAYS
7. FEELING AFRAID AS IF SOMETHING AWFUL MIGHT HAPPEN: NEARLY EVERY DAY
3. WORRYING TOO MUCH ABOUT DIFFERENT THINGS: NEARLY EVERY DAY
GAD7 TOTAL SCORE: 20
2. NOT BEING ABLE TO STOP OR CONTROL WORRYING: NEARLY EVERY DAY

## 2020-02-20 ASSESSMENT — MIFFLIN-ST. JEOR: SCORE: 1829.94

## 2020-02-20 ASSESSMENT — PATIENT HEALTH QUESTIONNAIRE - PHQ9
5. POOR APPETITE OR OVEREATING: NEARLY EVERY DAY
SUM OF ALL RESPONSES TO PHQ QUESTIONS 1-9: 22

## 2020-02-20 NOTE — LETTER
My Depression Action Plan  Name: Main Villalpando   Date of Birth 1980  Date: 2/20/2020    My doctor: Henrietta Hooker   My clinic: [address] [address] [phone]          GREEN    ZONE   Good Control    What it looks like:     Things are going generally well. You have normal up s and down s. You may even feel depressed from time to time, but bad moods usually last less than a day.   What you need to do:  1. Continue to care for yourself (see self care plan)  2. Check your depression survival kit and update it as needed  3. Follow your physician s recommendations including any medication.  4. Do not stop taking medication unless you consult with your physician first.           YELLOW         ZONE Getting Worse    What it looks like:     Depression is starting to interfere with your life.     It may be hard to get out of bed; you may be starting to isolate yourself from others.    Symptoms of depression are starting to last most all day and this has happened for several days.     You may have suicidal thoughts but they are not constant.   What you need to do:     1. Call your care team, your response to treatment will improve if you keep your care team informed of your progress. Yellow periods are signs an adjustment may need to be made.     2. Continue your self-care, even if you have to fake it!    3. Talk to someone in your support network    4. Open up your depression survival kit           RED    ZONE Medical Alert - Get Help    What it looks like:     Depression is seriously interfering with your life.     You may experience these or other symptoms: You can t get out of bed most days, can t work or engage in other necessary activities, you have trouble taking care of basic hygiene, or basic responsibilities, thoughts of suicide or death that will not go away, self-injurious behavior.     What you need to do:  1. Call your care team and request a  same-day appointment. If they are not available (weekends or after hours) call your local crisis line, emergency room or 911.      Electronically signed by: Evaristo Hopkins MD PhD, February 20, 2020    Depression Self Care Plan / Survival Kit    Self-Care for Depression  Here s the deal. Your body and mind are really not as separate as most people think.  What you do and think affects how you feel and how you feel influences what you do and think. This means if you do things that people who feel good do, it will help you feel better.  Sometimes this is all it takes.  There is also a place for medication and therapy depending on how severe your depression is, so be sure to consult with your medical provider and/ or Behavioral Health Consultant if your symptoms are worsening or not improving.     In order to better manage my stress, I will:    Exercise  Get some form of exercise, every day. This will help reduce pain and release endorphins, the  feel good  chemicals in your brain. This is almost as good as taking antidepressants!  This is not the same as joining a gym and then never going! (they count on that by the way ) It can be as simple as just going for a walk or doing some gardening, anything that will get you moving.      Hygiene   Maintain good hygiene (Get out of bed in the morning, Make your bed, Brush your teeth, Take a shower, and Get dressed like you were going to work, even if you are unemployed).  If your clothes don't fit try to get ones that do.    Diet  I will strive to eat foods that are good for me, drink plenty of water, and avoid excessive sugar, caffeine, alcohol, and other mood-altering substances.  Some foods that are helpful in depression are: complex carbohydrates, B vitamins, flaxseed, fish or fish oil, fresh fruits and vegetables.    Psychotherapy  I agree to participate in Individual Therapy (if recommended).    Medication  If prescribed medications, I agree to take them.  Missing doses can  result in serious side effects.  I understand that drinking alcohol, or other illicit drug use, may cause potential side effects.  I will not stop my medication abruptly without first discussing it with my provider.    Staying Connected With Others  I will stay in touch with my friends, family members, and my primary care provider/team.    Use your imagination  Be creative.  We all have a creative side; it doesn t matter if it s oil painting, sand castles, or mud pies! This will also kick up the endorphins.    Witness Beauty  (AKA stop and smell the roses) Take a look outside, even in mid-winter. Notice colors, textures. Watch the squirrels and birds.     Service to others  Be of service to others.  There is always someone else in need.  By helping others we can  get out of ourselves  and remember the really important things.  This also provides opportunities for practicing all the other parts of the program.    Humor  Laugh and be silly!  Adjust your TV habits for less news and crime-drama and more comedy.    Control your stress  Try breathing deep, massage therapy, biofeedback, and meditation. Find time to relax each day.     My support system    Clinic Contact:  Phone number:    Contact 1:  Phone number:    Contact 2:  Phone number:    [employer]/:  Phone number:    Therapist:  Phone number:    Local crisis center:    Phone number:    Other community support:  Phone number:

## 2020-02-20 NOTE — PROGRESS NOTES
3  SUBJECTIVE:   CC: Main Villalpando is an 39 year old male who presents for preventive health visit.     Healthy Habits:    Do you get at least three servings of calcium containing foods daily (dairy, green leafy vegetables, etc.)? yes    Amount of exercise or daily activities, outside of work: 0 day(s) per week    Problems taking medications regularly No    Medication side effects: No    Have you had an eye exam in the past two years? yes    Do you see a dentist twice per year? yes    Do you have sleep apnea, excessive snoring or daytime drowsiness?yes    HPI:  Patient is new to this provider, he has multiple concerns.  1.  He has snoring, feels tired all the time, will like to get evaluated for sleep apnea.  He did have a referral from a year ago but did not go.  2.  History of hypertriglyceridemia.  Was on fenofibrate last year, but he quit taking it.  3.  Depression: Patient reported depression.  Last year was put on Wellbutrin, but he stopped taking it now.  He would like to restart medication.  He would like to see a therapist.  4.  Smoking: Patient finds that smoking helps him relax.  He does not want to continue to smoke.  However he feels that until his depression is treated, it will be hard for him to quit smoking.  5.  History of mild intermittent asthma.  He has albuterol on as-needed basis.  Asthma is usually triggered by rest seasonal change in temperature, viral URIs, and running.  6.  History of vitamin D deficiency, was given vitamin D supplement.  He quit taking that 2.      Today's PHQ-2 Score:   PHQ-2 ( 1999 Pf[employer]) 2/17/2020 4/12/2019   Q1: Little interest or pleasure in doing things 2 2   Q2: Feeling down, depressed or hopeless 3 2   PHQ-2 Score 5 4   Q1: Little interest or pleasure in doing things More than half the days Several days   Q2: Feeling down, depressed or hopeless Nearly every day More than half the days   PHQ-2 Score 5 3       Abuse: Current or Past(Physical, Sexual or Emotional)-  "No  Do you feel safe in your environment? Yes        Social History     Tobacco Use     Smoking status: Current Every Day Smoker     Packs/day: 0.50     Types: Cigarettes     Smokeless tobacco: Never Used   Substance Use Topics     Alcohol use: Yes     If you drink alcohol do you typically have >3 drinks per day or >7 drinks per week? No                      Last PSA: No results found for: PSA    Reviewed orders with patient. Reviewed health maintenance and updated orders accordingly - Yes  Labs reviewed in EPIC    Reviewed and updated as needed this visit by clinical staff  Tobacco  Allergies  Meds  Med Hx  Surg Hx  Fam Hx  Soc Hx        Reviewed and updated as needed this visit by Provider            ROS:  Constitutional, HEENT, cardiovascular, pulmonary, gi and gu systems are negative, except as otherwise noted.     OBJECTIVE:   /83   Pulse 90   Temp 98  F (36.7  C) (Temporal)   Ht 1.67 m (5' 5.75\")   Wt 96.6 kg (213 lb)   SpO2 97%   BMI 34.64 kg/m    EXAM:  GENERAL: healthy, alert and no distress  EYES: Eyes grossly normal to inspection, PERRL and conjunctivae and sclerae normal  HENT: ear canals and TM's normal, nose and mouth without ulcers or lesions  NECK: no adenopathy, no asymmetry, masses, or scars and thyroid normal to palpation  RESP: lungs clear to auscultation - no rales, rhonchi or wheezes  CV: regular rate and rhythm, normal S1 S2, no S3 or S4, no murmur, click or rub, no peripheral edema and peripheral pulses strong  ABDOMEN: soft, nontender, no hepatosplenomegaly, no masses and bowel sounds normal   (male): normal male genitalia without lesions or urethral discharge, no hernia  MS: no gross musculoskeletal defects noted, no edema  SKIN: no suspicious lesions or rashes  NEURO: Normal strength and tone, mentation intact and speech normal  PSYCH: mentation appears normal, affect normal/bright    Diagnostic Test Results:  Will return for labs.     ASSESSMENT/PLAN:       ICD-10-CM  " "  1. Routine general medical examination at a health care facility Z00.00    2. Vitamin D insufficiency E55.9 Vitamin D Deficiency   3. Snores R06.83    4. Hypertriglyceridemia E78.1 Lipid panel reflex to direct LDL Fasting   5. Mild intermittent asthma without complication J45.20    6. Screening for diabetes mellitus Z13.1 Glucose   7. Severe depression (H) F32.2 FLUoxetine 20 MG PO capsule   8. BOB (generalized anxiety disorder) F41.1 FLUoxetine 20 MG PO capsule   9. Fatigue, unspecified type R53.83 CBC with platelets   10. Hypersomnia G47.10    11. Tobacco use disorder F17.200    12. Need for pneumococcal vaccination Z23 PNEUMOCOCCAL VACCINE,ADULT,SQ OR IM   13. Encounter for screening for HIV Z11.4 HIV Antigen Antibody Combo     -- multiple issues  -- severe depression/anxiety: No suicidal ideation.  Will start fluoxetine 20 mg daily, connect him with circumventing, our behavioral health clinician to evaluate.  Follow-up in 2 to 3 weeks.  --Snoring/hypersomnia: We will have him see sleep medicine for evaluation.  His referral is still active.  I gave him the information to call and schedule.  --Fatigue: Likely related to untreated sleep apnea.  We will add a CBC and TSH.  --History of vitamin D deficiency and hyper triglyceridemia: Patient will return for fasting labs and we will decide on medical therapy at the follow-up appointment.  --Mild intermittent asthma: ACT score is 24.  AAP updated.    Preventive care: Updated Pneumovax due to history of asthma.    COUNSELING:  Reviewed preventive health counseling, as reflected in patient instructions    Estimated body mass index is 34.64 kg/m  as calculated from the following:    Height as of this encounter: 1.67 m (5' 5.75\").    Weight as of this encounter: 96.6 kg (213 lb).    Weight management plan: not addressed.      reports that he has been smoking cigarettes. He has been smoking about 0.50 packs per day. He has never used smokeless tobacco.  Tobacco Cessation " Action Plan: not ready to quit yet    Counseling Resources:  ATP IV Guidelines  Pooled Cohorts Equation Calculator  FRAX Risk Assessment  ICSI Preventive Guidelines  Dietary Guidelines for Americans, 2010  USDA's MyPlate  ASA Prophylaxis  Lung CA Screening    Evaristo Hopkins MD PhD  [facility]

## 2020-02-20 NOTE — LETTER
My Asthma Action Plan    Name: Main Villalpando   YOB: 1980  Date: 2/20/2020   My doctor: Evaristo Hopkins MD PhD   My clinic: [facility]        My Rescue Medicine:   Albuterol inhaler (Proair/Ventolin/Proventil HFA)  2-4 puffs EVERY 4 HOURS as needed. Use a spacer if recommended by your provider.   My Asthma Severity:   Intermittent / Exercise Induced  Know your asthma triggers: upper respiratory infections and season change and running             GREEN ZONE   Good Control    I feel good    No cough or wheeze    Can work, sleep and play without asthma symptoms       Take your asthma control medicine every day.     1. If exercise triggers your asthma, take your rescue medication    15 minutes before exercise or sports, and    During exercise if you have asthma symptoms  2. Spacer to use with inhaler: If you have a spacer, make sure to use it with your inhaler             YELLOW ZONE Getting Worse  I have ANY of these:    I do not feel good    Cough or wheeze    Chest feels tight    Wake up at night   1. Keep taking your Green Zone medications  2. Start taking your rescue medicine:    every 20 minutes for up to 1 hour. Then every 4 hours for 24-48 hours.  3. If you stay in the Yellow Zone for more than 12-24 hours, contact your doctor.  4. If you do not return to the Green Zone in 12-24 hours or you get worse, start taking your oral steroid medicine if prescribed by your provider.           RED ZONE Medical Alert - Get Help  I have ANY of these:    I feel awful    Medicine is not helping    Breathing getting harder    Trouble walking or talking    Nose opens wide to breathe       1. Take your rescue medicine NOW  2. If your provider has prescribed an oral steroid medicine, start taking it NOW  3. Call your doctor NOW  4. If you are still in the Red Zone after 20 minutes and you have not reached your doctor:    Take your rescue medicine again and    Call 911 or go to the emergency room right  away    See your regular doctor within 2 weeks of an Emergency Room or Urgent Care visit for follow-up treatment.          Annual Reminders:  Meet with Asthma Educator,  Flu Shot in the Fall, consider Pneumonia Vaccination for patients with asthma (aged 19 and older).    Pharmacy: Data Unavailable    Electronically signed by Evaristo Hopkins MD PhD   Date: 02/20/20                    Asthma Triggers  How To Control Things That Make Your Asthma Worse    Triggers are things that make your asthma worse.  Look at the list below to help you find your triggers and   what you can do about them. You can help prevent asthma flare-ups by staying away from your triggers.      Trigger                                                          What you can do   Cigarette Smoke  Tobacco smoke can make asthma worse. Do not allow smoking in your home, car or around you.  Be sure no one smokes at a child s day care or school.  If you smoke, ask your health care provider for ways to help you quit.  Ask family members to quit too.  Ask your health care provider for a referral to Quit Plan to help you quit smoking, or call 7-250-090-PLAN.     Colds, Flu, Bronchitis  These are common triggers of asthma. Wash your hands often.  Don t touch your eyes, nose or mouth.  Get a flu shot every year.     Dust Mites  These are tiny bugs that live in cloth or carpet. They are too small to see. Wash sheets and blankets in hot water every week.   Encase pillows and mattress in dust mite proof covers.  Avoid having carpet if you can. If you have carpet, vacuum weekly.   Use a dust mask and HEPA vacuum.   Pollen and Outdoor Mold  Some people are allergic to trees, grass, or weed pollen, or molds. Try to keep your windows closed.  Limit time out doors when pollen count is high.   Ask you health care provider about taking medicine during allergy season.     Animal Dander  Some people are allergic to skin flakes, urine or saliva from pets with fur or feathers.  Keep pets with fur or feathers out of your home.    If you can t keep the pet outdoors, then keep the pet out of your bedroom.  Keep the bedroom door closed.  Keep pets off cloth furniture and away from stuffed toys.     Mice, Rats, and Cockroaches  Some people are allergic to the waste from these pests.   Cover food and garbage.  Clean up spills and food crumbs.  Store grease in the refrigerator.   Keep food out of the bedroom.   Indoor Mold  This can be a trigger if your home has high moisture. Fix leaking faucets, pipes, or other sources of water.   Clean moldy surfaces.  Dehumidify basement if it is damp and smelly.   Smoke, Strong Odors, and Sprays  These can reduce air quality. Stay away from strong odors and sprays, such as perfume, powder, hair spray, paints, smoke incense, paint, cleaning products, candles and new carpet.   Exercise or Sports  Some people with asthma have this trigger. Be active!  Ask your doctor about taking medicine before sports or exercise to prevent symptoms.    Warm up for 5-10 minutes before and after sports or exercise.     Other Triggers of Asthma  Cold air:  Cover your nose and mouth with a scarf.  Sometimes laughing or crying can be a trigger.  Some medicines and food can trigger asthma.

## 2020-02-20 NOTE — PATIENT INSTRUCTIONS
Make appointment(s) for:   -- Gladys Cleaning  -- fasting lab appointment.   -- follow up with PCP in 2 - 3 weeks.       You have a referral for Sleep Evaluation at [facility] [phone]      Medication(s) prescribed today:    Orders Placed This Encounter   Medications     FLUoxetine 20 MG PO capsule     Sig: Take 1 capsule (20 mg) by mouth daily     Dispense:  30 capsule     Refill:  0           Preventive Health Recommendations  Male Ages 26 - 39    Yearly exam:             See your health care provider every year in order to  o   Review health changes.   o   Discuss preventive care.    o   Review your medicines if your doctor has prescribed any.    You should be tested each year for STDs (sexually transmitted diseases), if you re at risk.     After age 35, talk to your provider about cholesterol testing. If you are at risk for heart disease, have your cholesterol tested at least every 5 years.     If you are at risk for diabetes, you should have a diabetes test (fasting glucose).  Shots: Get a flu shot each year. Get a tetanus shot every 10 years.     Nutrition:    Eat at least 5 servings of fruits and vegetables daily.     Eat whole-grain bread, whole-wheat pasta and brown rice instead of white grains and rice.     Get adequate Calcium and Vitamin D.     Lifestyle    Exercise for at least 150 minutes a week (30 minutes a day, 5 days a week). This will help you control your weight and prevent disease.     Limit alcohol to one drink per day.     No smoking.     Wear sunscreen to prevent skin cancer.     See your dentist every six months for an exam and cleaning.

## 2020-02-21 ASSESSMENT — ASTHMA QUESTIONNAIRES: ACT_TOTALSCORE: 24

## 2020-02-21 ASSESSMENT — ANXIETY QUESTIONNAIRES: GAD7 TOTAL SCORE: 20

## 2020-02-21 NOTE — NURSING NOTE
Screening Questionnaire for Adult Immunization    Are you sick today?   No   Do you have allergies to medications, food, a vaccine component or latex?   No   Have you ever had a serious reaction after receiving a vaccination?   No   Do you have a long-term health problem with heart disease, lung disease, asthma, kidney disease, metabolic disease (e.g. diabetes), anemia, or other blood disorder?   No   Do you have cancer, leukemia, HIV/AIDS, or any other immune system problem?   No   In the past 3 months, have you taken medications that affect  your immune system, such as prednisone, other steroids, or anticancer drugs; drugs for the treatment of rheumatoid arthritis, Crohn s disease, or psoriasis; or have you had radiation treatments?   No   Have you had a seizure, or a brain or other nervous system problem?   No   During the past year, have you received a transfusion of blood or blood     products, or been given immune (gamma) globulin or antiviral drug?   No   For women: Are you pregnant or is there a chance you could become        pregnant during the next month?   No   Have you received any vaccinations in the past 4 weeks?   No     Immunization questionnaire answers were all negative.      MNVFC doesn't apply on this patient           Screening performed by Petra Cooper

## 2020-03-14 DIAGNOSIS — R53.83 FATIGUE, UNSPECIFIED TYPE: ICD-10-CM

## 2020-03-14 DIAGNOSIS — E78.1 HYPERTRIGLYCERIDEMIA: Chronic | ICD-10-CM

## 2020-03-14 DIAGNOSIS — Z11.4 ENCOUNTER FOR SCREENING FOR HIV: ICD-10-CM

## 2020-03-14 DIAGNOSIS — Z13.1 SCREENING FOR DIABETES MELLITUS: ICD-10-CM

## 2020-03-14 DIAGNOSIS — E55.9 VITAMIN D INSUFFICIENCY: ICD-10-CM

## 2020-03-14 LAB
CHOLEST SERPL-MCNC: 241 MG/DL
ERYTHROCYTE [DISTWIDTH] IN BLOOD BY AUTOMATED COUNT: 13.7 % (ref 10–15)
GLUCOSE SERPL-MCNC: 99 MG/DL (ref 70–99)
HCT VFR BLD AUTO: 47.1 % (ref 40–53)
HDLC SERPL-MCNC: 35 MG/DL
HGB BLD-MCNC: 15.6 G/DL (ref 13.3–17.7)
LDLC SERPL CALC-MCNC: 172 MG/DL
MCH RBC QN AUTO: 28.1 PG (ref 26.5–33)
MCHC RBC AUTO-ENTMCNC: 33.1 G/DL (ref 31.5–36.5)
MCV RBC AUTO: 85 FL (ref 78–100)
NONHDLC SERPL-MCNC: 206 MG/DL
PLATELET # BLD AUTO: 286 10E9/L (ref 150–450)
RBC # BLD AUTO: 5.56 10E12/L (ref 4.4–5.9)
TRIGL SERPL-MCNC: 169 MG/DL
TSH SERPL DL<=0.005 MIU/L-ACNC: 0.67 MU/L (ref 0.4–4)
WBC # BLD AUTO: 7.5 10E9/L (ref 4–11)

## 2020-03-14 PROCEDURE — 36415 COLL VENOUS BLD VENIPUNCTURE: CPT | Performed by: INTERNAL MEDICINE

## 2020-03-14 PROCEDURE — 87389 HIV-1 AG W/HIV-1&-2 AB AG IA: CPT | Performed by: INTERNAL MEDICINE

## 2020-03-14 PROCEDURE — 82306 VITAMIN D 25 HYDROXY: CPT | Performed by: INTERNAL MEDICINE

## 2020-03-14 PROCEDURE — 85027 COMPLETE CBC AUTOMATED: CPT | Performed by: INTERNAL MEDICINE

## 2020-03-14 PROCEDURE — 82947 ASSAY GLUCOSE BLOOD QUANT: CPT | Performed by: INTERNAL MEDICINE

## 2020-03-14 PROCEDURE — 80061 LIPID PANEL: CPT | Performed by: INTERNAL MEDICINE

## 2020-03-14 PROCEDURE — 84443 ASSAY THYROID STIM HORMONE: CPT | Performed by: INTERNAL MEDICINE

## 2020-03-16 LAB
DEPRECATED CALCIDIOL+CALCIFEROL SERPL-MC: 18 UG/L (ref 20–75)
HIV 1+2 AB+HIV1 P24 AG SERPL QL IA: NONREACTIVE

## 2020-03-27 NOTE — RESULT ENCOUNTER NOTE
Dear Main,   Your recent lab results showed the following:  -- Lipid panel is elevated. Prescription medication is not needed at this time. Healthy eating with low fat low cholesterol diet, exercise 30 minutes 3-5 times a week will help improve cholesterol. We'll monitor this annually with your physical.     I have enclosed information on low fat diet. If you need further guidance regarding a low fat diet, please contact the clinic for referral to see our medical nutritionist.     Please call or Mychart to our office if you have further questions.     Evaristo Hopkins MD-PhD

## 2020-03-27 NOTE — RESULT ENCOUNTER NOTE
Dear Main,   Your recent test result are within acceptable range or at baseline. Please continue with your current plan of care.       Please call or Mychart to our office if you have further questions.     Evaristo Hopkins MD-PhD

## 2020-12-13 ENCOUNTER — HEALTH MAINTENANCE LETTER (OUTPATIENT)
Age: 40
End: 2020-12-13

## 2021-04-17 ENCOUNTER — HEALTH MAINTENANCE LETTER (OUTPATIENT)
Age: 41
End: 2021-04-17

## 2021-05-26 ENCOUNTER — TELEPHONE (OUTPATIENT)
Dept: FAMILY MEDICINE | Facility: CLINIC | Age: 41
End: 2021-05-26

## 2021-05-26 NOTE — TELEPHONE ENCOUNTER
Patient Quality Outreach      Summary:    Patient has the following on his problem list/HM:     Depression / Dysthymia review    6 Month Remission: 4-8 month window range:   12 Month Remission: 10-14 month window range:        PHQ-9 SCORE 4/12/2019 2/17/2020 2/20/2020   PHQ-9 Total Score MyChart 20 (Severe depression) 22 (Severe depression) -   PHQ-9 Total Score 20 22 22       If PHQ-9 recheck is 5 or more, route to provider for next steps.    Patient is due/failing the following:   PHQ-9 Needed    Type of outreach:    Sent [employer] message.    Questions for provider review:    None                                                                                                                                     Gladys Joshi

## 2021-09-26 ENCOUNTER — HEALTH MAINTENANCE LETTER (OUTPATIENT)
Age: 41
End: 2021-09-26

## 2022-05-08 ENCOUNTER — HEALTH MAINTENANCE LETTER (OUTPATIENT)
Age: 42
End: 2022-05-08

## 2023-01-08 ENCOUNTER — HEALTH MAINTENANCE LETTER (OUTPATIENT)
Age: 43
End: 2023-01-08

## 2024-02-11 ENCOUNTER — HEALTH MAINTENANCE LETTER (OUTPATIENT)
Age: 44
End: 2024-02-11

## (undated) DEVICE — SOL WATER IRRIG 1000ML BOTTLE 07139-09

## (undated) RX ORDER — FENTANYL CITRATE 50 UG/ML
INJECTION, SOLUTION INTRAMUSCULAR; INTRAVENOUS
Status: DISPENSED
Start: 2017-10-06